# Patient Record
Sex: MALE | Race: WHITE | NOT HISPANIC OR LATINO | URBAN - METROPOLITAN AREA
[De-identification: names, ages, dates, MRNs, and addresses within clinical notes are randomized per-mention and may not be internally consistent; named-entity substitution may affect disease eponyms.]

---

## 2020-12-23 ENCOUNTER — INPATIENT (INPATIENT)
Facility: HOSPITAL | Age: 70
LOS: 5 days | Discharge: NURSING, REHAB OR LONG-TERM CARE | End: 2020-12-29
Attending: HOSPITALIST | Admitting: HOSPITALIST
Payer: MEDICARE

## 2020-12-23 VITALS
DIASTOLIC BLOOD PRESSURE: 53 MMHG | TEMPERATURE: 100 F | SYSTOLIC BLOOD PRESSURE: 102 MMHG | OXYGEN SATURATION: 92 % | HEART RATE: 67 BPM

## 2020-12-23 DIAGNOSIS — I50.9 HEART FAILURE, UNSPECIFIED: ICD-10-CM

## 2020-12-23 DIAGNOSIS — G40.909 EPILEPSY, UNSPECIFIED, NOT INTRACTABLE, WITHOUT STATUS EPILEPTICUS: ICD-10-CM

## 2020-12-23 DIAGNOSIS — K52.9 NONINFECTIVE GASTROENTERITIS AND COLITIS, UNSPECIFIED: ICD-10-CM

## 2020-12-23 DIAGNOSIS — E87.1 HYPO-OSMOLALITY AND HYPONATREMIA: ICD-10-CM

## 2020-12-23 DIAGNOSIS — Z95.0 PRESENCE OF CARDIAC PACEMAKER: Chronic | ICD-10-CM

## 2020-12-23 DIAGNOSIS — R56.9 UNSPECIFIED CONVULSIONS: ICD-10-CM

## 2020-12-23 DIAGNOSIS — N39.0 URINARY TRACT INFECTION, SITE NOT SPECIFIED: ICD-10-CM

## 2020-12-23 DIAGNOSIS — F29 UNSPECIFIED PSYCHOSIS NOT DUE TO A SUBSTANCE OR KNOWN PHYSIOLOGICAL CONDITION: ICD-10-CM

## 2020-12-23 DIAGNOSIS — I25.10 ATHEROSCLEROTIC HEART DISEASE OF NATIVE CORONARY ARTERY WITHOUT ANGINA PECTORIS: ICD-10-CM

## 2020-12-23 DIAGNOSIS — J44.9 CHRONIC OBSTRUCTIVE PULMONARY DISEASE, UNSPECIFIED: ICD-10-CM

## 2020-12-23 LAB
ALBUMIN SERPL ELPH-MCNC: 3.1 G/DL — LOW (ref 3.5–5.2)
ALP SERPL-CCNC: 85 U/L — SIGNIFICANT CHANGE UP (ref 30–115)
ALT FLD-CCNC: 11 U/L — SIGNIFICANT CHANGE UP (ref 0–41)
ANION GAP SERPL CALC-SCNC: 10 MMOL/L — SIGNIFICANT CHANGE UP (ref 7–14)
ANISOCYTOSIS BLD QL: SLIGHT — SIGNIFICANT CHANGE UP
APPEARANCE UR: ABNORMAL
APTT BLD: 29 SEC — SIGNIFICANT CHANGE UP (ref 27–39.2)
AST SERPL-CCNC: 24 U/L — SIGNIFICANT CHANGE UP (ref 0–41)
BACTERIA # UR AUTO: ABNORMAL /HPF
BASOPHILS # BLD AUTO: 0 K/UL — SIGNIFICANT CHANGE UP (ref 0–0.2)
BASOPHILS NFR BLD AUTO: 0 % — SIGNIFICANT CHANGE UP (ref 0–1)
BILIRUB SERPL-MCNC: 0.8 MG/DL — SIGNIFICANT CHANGE UP (ref 0.2–1.2)
BILIRUB UR-MCNC: NEGATIVE — SIGNIFICANT CHANGE UP
BUN SERPL-MCNC: 14 MG/DL — SIGNIFICANT CHANGE UP (ref 10–20)
CALCIUM SERPL-MCNC: 8.4 MG/DL — LOW (ref 8.5–10.1)
CHLORIDE SERPL-SCNC: 81 MMOL/L — LOW (ref 98–110)
CO2 SERPL-SCNC: 25 MMOL/L — SIGNIFICANT CHANGE UP (ref 17–32)
COLOR SPEC: YELLOW — SIGNIFICANT CHANGE UP
CREAT SERPL-MCNC: 1 MG/DL — SIGNIFICANT CHANGE UP (ref 0.7–1.5)
DIFF PNL FLD: ABNORMAL
EOSINOPHIL NFR BLD AUTO: 0 % — SIGNIFICANT CHANGE UP (ref 0–8)
EPI CELLS # UR: ABNORMAL /HPF
GLUCOSE SERPL-MCNC: 119 MG/DL — HIGH (ref 70–99)
GLUCOSE UR QL: NEGATIVE — SIGNIFICANT CHANGE UP
HCT VFR BLD CALC: 30.9 % — LOW (ref 42–52)
HGB BLD-MCNC: 10.6 G/DL — LOW (ref 14–18)
INR BLD: 1.23 RATIO — SIGNIFICANT CHANGE UP (ref 0.65–1.3)
KETONES UR-MCNC: 40
LACTATE SERPL-SCNC: 1.8 MMOL/L — SIGNIFICANT CHANGE UP (ref 0.7–2)
LEUKOCYTE ESTERASE UR-ACNC: ABNORMAL
LYMPHOCYTES # BLD AUTO: 0.48 K/UL — LOW (ref 1.2–3.4)
LYMPHOCYTES # BLD AUTO: 2 % — LOW (ref 20.5–51.1)
MANUAL SMEAR VERIFICATION: SIGNIFICANT CHANGE UP
MCHC RBC-ENTMCNC: 33.1 PG — HIGH (ref 27–31)
MCHC RBC-ENTMCNC: 34.3 G/DL — SIGNIFICANT CHANGE UP (ref 32–37)
MCV RBC AUTO: 96.6 FL — HIGH (ref 80–94)
MONOCYTES # BLD AUTO: 0.73 K/UL — HIGH (ref 0.1–0.6)
MONOCYTES NFR BLD AUTO: 3 % — SIGNIFICANT CHANGE UP (ref 1.7–9.3)
NEUTROPHILS # BLD AUTO: 22.97 K/UL — HIGH (ref 1.4–6.5)
NEUTROPHILS NFR BLD AUTO: 95 % — HIGH (ref 42.2–75.2)
NITRITE UR-MCNC: POSITIVE
NRBC # BLD: 0 /100 — SIGNIFICANT CHANGE UP (ref 0–0)
NRBC # BLD: SIGNIFICANT CHANGE UP /100 WBCS (ref 0–0)
PH UR: 7 — SIGNIFICANT CHANGE UP (ref 5–8)
PLAT MORPH BLD: NORMAL — SIGNIFICANT CHANGE UP
PLATELET # BLD AUTO: 205 K/UL — SIGNIFICANT CHANGE UP (ref 130–400)
POTASSIUM SERPL-MCNC: 5 MMOL/L — SIGNIFICANT CHANGE UP (ref 3.5–5)
POTASSIUM SERPL-SCNC: 5 MMOL/L — SIGNIFICANT CHANGE UP (ref 3.5–5)
PROT SERPL-MCNC: 5.2 G/DL — LOW (ref 6–8)
PROT UR-MCNC: 100
PROTHROM AB SERPL-ACNC: 14.1 SEC — HIGH (ref 9.95–12.87)
RAPID RVP RESULT: SIGNIFICANT CHANGE UP
RBC # BLD: 3.2 M/UL — LOW (ref 4.7–6.1)
RBC # FLD: 13.9 % — SIGNIFICANT CHANGE UP (ref 11.5–14.5)
RBC BLD AUTO: ABNORMAL
RBC CASTS # UR COMP ASSIST: ABNORMAL /HPF
SARS-COV-2 RNA SPEC QL NAA+PROBE: SIGNIFICANT CHANGE UP
SODIUM SERPL-SCNC: 116 MMOL/L — CRITICAL LOW (ref 135–146)
SP GR SPEC: 1.02 — SIGNIFICANT CHANGE UP (ref 1.01–1.03)
TROPONIN T SERPL-MCNC: 0.03 NG/ML — CRITICAL HIGH
UROBILINOGEN FLD QL: 0.2 — SIGNIFICANT CHANGE UP (ref 0.2–0.2)
WBC # BLD: 24.18 K/UL — HIGH (ref 4.8–10.8)
WBC # FLD AUTO: 24.18 K/UL — HIGH (ref 4.8–10.8)
WBC UR QL: >50 /HPF

## 2020-12-23 PROCEDURE — 99222 1ST HOSP IP/OBS MODERATE 55: CPT

## 2020-12-23 PROCEDURE — 99285 EMERGENCY DEPT VISIT HI MDM: CPT

## 2020-12-23 PROCEDURE — 74177 CT ABD & PELVIS W/CONTRAST: CPT | Mod: 26

## 2020-12-23 PROCEDURE — 71045 X-RAY EXAM CHEST 1 VIEW: CPT | Mod: 26

## 2020-12-23 PROCEDURE — 99497 ADVNCD CARE PLAN 30 MIN: CPT | Mod: 25

## 2020-12-23 PROCEDURE — 99223 1ST HOSP IP/OBS HIGH 75: CPT

## 2020-12-23 RX ORDER — CHLORHEXIDINE GLUCONATE 213 G/1000ML
1 SOLUTION TOPICAL
Refills: 0 | Status: DISCONTINUED | OUTPATIENT
Start: 2020-12-23 | End: 2020-12-29

## 2020-12-23 RX ORDER — LAMOTRIGINE 25 MG/1
1 TABLET, ORALLY DISINTEGRATING ORAL
Qty: 0 | Refills: 0 | DISCHARGE

## 2020-12-23 RX ORDER — ALBUTEROL 90 UG/1
2 AEROSOL, METERED ORAL EVERY 6 HOURS
Refills: 0 | Status: DISCONTINUED | OUTPATIENT
Start: 2020-12-23 | End: 2020-12-29

## 2020-12-23 RX ORDER — PALIPERIDONE 1.5 MG/1
1 TABLET, EXTENDED RELEASE ORAL
Qty: 0 | Refills: 0 | DISCHARGE

## 2020-12-23 RX ORDER — FOLIC ACID 0.8 MG
2 TABLET ORAL
Qty: 0 | Refills: 0 | DISCHARGE

## 2020-12-23 RX ORDER — LAMOTRIGINE 25 MG/1
0 TABLET, ORALLY DISINTEGRATING ORAL
Qty: 0 | Refills: 0 | DISCHARGE

## 2020-12-23 RX ORDER — UMECLIDINIUM 62.5 UG/1
1 AEROSOL, POWDER ORAL
Qty: 0 | Refills: 0 | DISCHARGE

## 2020-12-23 RX ORDER — ONDANSETRON 8 MG/1
4 TABLET, FILM COATED ORAL ONCE
Refills: 0 | Status: COMPLETED | OUTPATIENT
Start: 2020-12-23 | End: 2020-12-23

## 2020-12-23 RX ORDER — PREGABALIN 225 MG/1
1 CAPSULE ORAL
Qty: 0 | Refills: 0 | DISCHARGE

## 2020-12-23 RX ORDER — CLONAZEPAM 1 MG
0.25 TABLET ORAL
Refills: 0 | Status: DISCONTINUED | OUTPATIENT
Start: 2020-12-23 | End: 2020-12-29

## 2020-12-23 RX ORDER — HYDROCORTISONE 20 MG
50 TABLET ORAL THREE TIMES A DAY
Refills: 0 | Status: DISCONTINUED | OUTPATIENT
Start: 2020-12-23 | End: 2020-12-25

## 2020-12-23 RX ORDER — CLONAZEPAM 1 MG
1 TABLET ORAL
Qty: 0 | Refills: 0 | DISCHARGE

## 2020-12-23 RX ORDER — ASPIRIN/CALCIUM CARB/MAGNESIUM 324 MG
0 TABLET ORAL
Qty: 0 | Refills: 0 | DISCHARGE

## 2020-12-23 RX ORDER — CARVEDILOL PHOSPHATE 80 MG/1
0 CAPSULE, EXTENDED RELEASE ORAL
Qty: 0 | Refills: 0 | DISCHARGE

## 2020-12-23 RX ORDER — CIPROFLOXACIN LACTATE 400MG/40ML
400 VIAL (ML) INTRAVENOUS EVERY 12 HOURS
Refills: 0 | Status: DISCONTINUED | OUTPATIENT
Start: 2020-12-23 | End: 2020-12-24

## 2020-12-23 RX ORDER — METHOTREXATE 2.5 MG/1
0 TABLET ORAL
Qty: 0 | Refills: 0 | DISCHARGE

## 2020-12-23 RX ORDER — CHOLECALCIFEROL (VITAMIN D3) 125 MCG
1 CAPSULE ORAL
Qty: 0 | Refills: 0 | DISCHARGE

## 2020-12-23 RX ORDER — PANTOPRAZOLE SODIUM 20 MG/1
40 TABLET, DELAYED RELEASE ORAL
Refills: 0 | Status: DISCONTINUED | OUTPATIENT
Start: 2020-12-23 | End: 2020-12-29

## 2020-12-23 RX ORDER — METRONIDAZOLE 500 MG
500 TABLET ORAL ONCE
Refills: 0 | Status: DISCONTINUED | OUTPATIENT
Start: 2020-12-23 | End: 2020-12-24

## 2020-12-23 RX ORDER — HEPARIN SODIUM 5000 [USP'U]/ML
5000 INJECTION INTRAVENOUS; SUBCUTANEOUS EVERY 12 HOURS
Refills: 0 | Status: DISCONTINUED | OUTPATIENT
Start: 2020-12-23 | End: 2020-12-29

## 2020-12-23 RX ORDER — SODIUM CHLORIDE 9 MG/ML
1000 INJECTION INTRAMUSCULAR; INTRAVENOUS; SUBCUTANEOUS ONCE
Refills: 0 | Status: COMPLETED | OUTPATIENT
Start: 2020-12-23 | End: 2020-12-23

## 2020-12-23 RX ORDER — METHOTREXATE 2.5 MG/1
2.5 TABLET ORAL
Refills: 0 | Status: DISCONTINUED | OUTPATIENT
Start: 2020-12-28 | End: 2020-12-28

## 2020-12-23 RX ORDER — ONDANSETRON 8 MG/1
4 TABLET, FILM COATED ORAL ONCE
Refills: 0 | Status: DISCONTINUED | OUTPATIENT
Start: 2020-12-23 | End: 2020-12-29

## 2020-12-23 RX ORDER — PREGABALIN 225 MG/1
1000 CAPSULE ORAL DAILY
Refills: 0 | Status: DISCONTINUED | OUTPATIENT
Start: 2020-12-23 | End: 2020-12-29

## 2020-12-23 RX ORDER — ALBUTEROL 90 UG/1
2 AEROSOL, METERED ORAL
Qty: 0 | Refills: 0 | DISCHARGE

## 2020-12-23 RX ORDER — METRONIDAZOLE 500 MG
500 TABLET ORAL EVERY 8 HOURS
Refills: 0 | Status: DISCONTINUED | OUTPATIENT
Start: 2020-12-23 | End: 2020-12-24

## 2020-12-23 RX ORDER — ASPIRIN/CALCIUM CARB/MAGNESIUM 324 MG
1 TABLET ORAL
Qty: 0 | Refills: 0 | DISCHARGE

## 2020-12-23 RX ORDER — ATORVASTATIN CALCIUM 80 MG/1
1 TABLET, FILM COATED ORAL
Qty: 0 | Refills: 0 | DISCHARGE

## 2020-12-23 RX ORDER — CEFEPIME 1 G/1
2000 INJECTION, POWDER, FOR SOLUTION INTRAMUSCULAR; INTRAVENOUS ONCE
Refills: 0 | Status: COMPLETED | OUTPATIENT
Start: 2020-12-23 | End: 2020-12-23

## 2020-12-23 RX ORDER — ASPIRIN/CALCIUM CARB/MAGNESIUM 324 MG
81 TABLET ORAL DAILY
Refills: 0 | Status: DISCONTINUED | OUTPATIENT
Start: 2020-12-23 | End: 2020-12-29

## 2020-12-23 RX ORDER — ATORVASTATIN CALCIUM 80 MG/1
40 TABLET, FILM COATED ORAL DAILY
Refills: 0 | Status: DISCONTINUED | OUTPATIENT
Start: 2020-12-23 | End: 2020-12-29

## 2020-12-23 RX ORDER — CHOLECALCIFEROL (VITAMIN D3) 125 MCG
1000 CAPSULE ORAL DAILY
Refills: 0 | Status: DISCONTINUED | OUTPATIENT
Start: 2020-12-23 | End: 2020-12-29

## 2020-12-23 RX ORDER — LAMOTRIGINE 25 MG/1
200 TABLET, ORALLY DISINTEGRATING ORAL
Refills: 0 | Status: DISCONTINUED | OUTPATIENT
Start: 2020-12-23 | End: 2020-12-29

## 2020-12-23 RX ORDER — SODIUM CHLORIDE 9 MG/ML
1000 INJECTION, SOLUTION INTRAVENOUS ONCE
Refills: 0 | Status: COMPLETED | OUTPATIENT
Start: 2020-12-23 | End: 2020-12-23

## 2020-12-23 RX ORDER — CLONAZEPAM 1 MG
0 TABLET ORAL
Qty: 0 | Refills: 0 | DISCHARGE

## 2020-12-23 RX ORDER — SODIUM CHLORIDE 9 MG/ML
1000 INJECTION INTRAMUSCULAR; INTRAVENOUS; SUBCUTANEOUS
Refills: 0 | Status: DISCONTINUED | OUTPATIENT
Start: 2020-12-23 | End: 2020-12-25

## 2020-12-23 RX ORDER — FOLIC ACID 0.8 MG
2 TABLET ORAL DAILY
Refills: 0 | Status: DISCONTINUED | OUTPATIENT
Start: 2020-12-23 | End: 2020-12-29

## 2020-12-23 RX ADMIN — Medication 100 MILLIGRAM(S): at 18:25

## 2020-12-23 RX ADMIN — SODIUM CHLORIDE 100 MILLILITER(S): 9 INJECTION INTRAMUSCULAR; INTRAVENOUS; SUBCUTANEOUS at 23:00

## 2020-12-23 RX ADMIN — SODIUM CHLORIDE 1000 MILLILITER(S): 9 INJECTION, SOLUTION INTRAVENOUS at 12:00

## 2020-12-23 RX ADMIN — LAMOTRIGINE 200 MILLIGRAM(S): 25 TABLET, ORALLY DISINTEGRATING ORAL at 18:26

## 2020-12-23 RX ADMIN — Medication 100 MILLIGRAM(S): at 22:16

## 2020-12-23 RX ADMIN — ONDANSETRON 4 MILLIGRAM(S): 8 TABLET, FILM COATED ORAL at 12:55

## 2020-12-23 RX ADMIN — SODIUM CHLORIDE 1000 MILLILITER(S): 9 INJECTION, SOLUTION INTRAVENOUS at 14:07

## 2020-12-23 RX ADMIN — Medication 50 MILLIGRAM(S): at 22:16

## 2020-12-23 RX ADMIN — SODIUM CHLORIDE 1000 MILLILITER(S): 9 INJECTION INTRAMUSCULAR; INTRAVENOUS; SUBCUTANEOUS at 15:47

## 2020-12-23 RX ADMIN — CEFEPIME 2000 MILLIGRAM(S): 1 INJECTION, POWDER, FOR SOLUTION INTRAMUSCULAR; INTRAVENOUS at 14:06

## 2020-12-23 RX ADMIN — HEPARIN SODIUM 5000 UNIT(S): 5000 INJECTION INTRAVENOUS; SUBCUTANEOUS at 18:26

## 2020-12-23 RX ADMIN — CEFEPIME 100 MILLIGRAM(S): 1 INJECTION, POWDER, FOR SOLUTION INTRAMUSCULAR; INTRAVENOUS at 12:55

## 2020-12-23 RX ADMIN — SODIUM CHLORIDE 1000 MILLILITER(S): 9 INJECTION INTRAMUSCULAR; INTRAVENOUS; SUBCUTANEOUS at 14:00

## 2020-12-23 NOTE — H&P ADULT - NSICDXPASTMEDICALHX_GEN_ALL_CORE_FT
PAST MEDICAL HISTORY:  CAD (coronary artery disease)     CHF (congestive heart failure)     COPD, mild     Epilepsy     Psychoses paranoia

## 2020-12-23 NOTE — ED PROVIDER NOTE - OBJECTIVE STATEMENT
69 yo male, pmh htn, hld, seizure d/o, cad pacemaker, presents to ed for hypotension, fever and dysuria from NH. Pt states several days, unsure if has been on abx. Admits to weakness, generalized, no specific pain or radiation. Denies cp, sob, abd pain, nvd, le swelling, hematuria, back pain.

## 2020-12-23 NOTE — ED PROVIDER NOTE - CARE PLAN
Principal Discharge DX:	Septic shock  Secondary Diagnosis:	Hyponatremia  Secondary Diagnosis:	UTI (urinary tract infection)  Secondary Diagnosis:	Acute colitis  Secondary Diagnosis:	Elevated troponin   Principal Discharge DX:	Acute colitis  Secondary Diagnosis:	Hyponatremia  Secondary Diagnosis:	UTI (urinary tract infection)  Secondary Diagnosis:	Elevated troponin

## 2020-12-23 NOTE — BEHAVIORAL HEALTH ASSESSMENT NOTE - HPI (INCLUDE ILLNESS QUALITY, SEVERITY, DURATION, TIMING, CONTEXT, MODIFYING FACTORS, ASSOCIATED SIGNS AND SYMPTOMS)
Pt is a 69 yo Male, domiciled with his wife, retired, PPH of paranoia (Psychosis NOS), no prior IPP admissions, PMH significant for Htn, HLD, CAD s/p pacemaker, seizure disorder who is admitted to the medical floor for hypotension, fever and dysuria. Psychiatry consulted to recommend alternatives to Invega ER 3 mg as it is not available on the formulary.            On evaluation patient reports that he was prescribed Invega ER by his OP psychiatrist who he sees via tele-psych for some paranoid delusions he developed about a year ago. He notes that since he started the Invega ER he feels better and has not had any paranoia. He denies any mental health issues prior to last year. He denies any problems with his mood. he also denies anxiety except for his medical issues. He denies any panic attacks. He denies AH,VH, parnoia, manic or hypomanic symptoms. Currently he denies any acute psychiatric symptoms.            Collateral has been obtained from the patient's wife Ms Gladis Paniagua ([phone]) who reports that the patient has been living by himself in his [place] in [place] until recently. He was having falls and was hospitalized which is why his wife who was living in [place] brought him here. She notes that during his last hospitalization he developed hospital delirium which resolved and at baseline he is sharp and does most things by himself. He is also active physically at baseline. She also corroborates patient's story about the paranoia and notes that she would be able to bring the Paliperidone to the hospital if needed.

## 2020-12-23 NOTE — ED PROVIDER NOTE - NS ED ROS FT
Constitutional: (+) fever, (-) chills, (+) weakness  Eyes: (-) visual changes  ENT: (-) nasal congestions  Cardiovascular: (-) chest pain, (-) syncope  Respiratory: (-) cough, (-) shortness of breath, (-) dyspnea,   Gastrointestinal: (-) vomiting, (-) diarrhea, (-)nausea,  Musculoskeletal: (-) neck pain, (-) back pain, (-) joint pain,  Integumentary: (-) rash, (-) edema, (-) bruises  Neurological: (-) headache, (-) loc, (-) dizziness, (-) tingling, (-)numbness  Peripheral Vascular: (-) leg swelling  :  (+)dysuria,  (-) hematuria  Allergic/Immunologic: (-) pruritus

## 2020-12-23 NOTE — H&P ADULT - ASSESSMENT
ASSESSMENT: Pt is a 70M w/ PMH  CHF, COPD, CAD, epilepsy admitted for UTI sepsis, hypotension, and hyponatremia. In ED, pt is febrile to 100.4 w/ WBC: 24.1. Last BP: 114/57, HR: 72, Na: 116, Troponin: 0.03, Positive UA, Ct abdomen: colonic wall thickening with pericolonic inflammation, mild ascites, and b/l pleural effusions. Pt was given 2g cefepime and 500mg flagyl, 1L LR, 1L NS and zofran in ED      PLAN: Case d/w Dr. Paz   - Admit to inpatient level of care - medicine   - Continue IV abx   - Continue IVF - NS @ 100  - ID consult   - Renal consult   - Psych consult   - Trend sodium   - AM labs   - Hold Coreg - 2/2 hypotension; restart when bp stabilizes  - Continue home medications  - Regular diet   - VTE: SQ heparin  - GI: protonix

## 2020-12-23 NOTE — BEHAVIORAL HEALTH ASSESSMENT NOTE - NSBHCHARTREVIEWVS_PSY_A_CORE FT
ICU Vital Signs Last 24 Hrs  T(C): 35.9 (23 Dec 2020 21:56), Max: 38 (23 Dec 2020 11:47)  T(F): 96.6 (23 Dec 2020 21:56), Max: 100.4 (23 Dec 2020 11:47)  HR: 63 (23 Dec 2020 21:56) (59 - 84)  BP: 100/56 (23 Dec 2020 21:56) (88/56 - 132/69)  RR: 17 (23 Dec 2020 21:56) (17 - 19)  SpO2: 95% (23 Dec 2020 17:06) (92% - 100%)

## 2020-12-23 NOTE — ED PROVIDER NOTE - ATTENDING CONTRIBUTION TO CARE
I was present for and supervised the key and critical aspects of the procedures performed during the care of the patient. ATTENDING NOTE: I personally evaluated the patient. I reviewed the Physician Assistant’s note (as assigned above), and agree with the findings and plan except as documented in my note. 71 y/o M PMH depression and seizures brought in for weakness and low blood pressure from rehab facility. Pt denies any GARCIA, visual changes, CP, SOB, ABD pain or back pain but does endorse nausea. + Texas catheter in place. On exam: (+) dehydrated appearing. NCAT. PERRLA, EOMI. OP clear. Lungs CTAB. RRR, S1S2 noted. Radial pulses 2+ and equal, pedal pulses 2+ and equal. Abdomen soft, NT/ND, no rebound or guarding. FROM x4 extremities. No focal neuro deficits. Plan: EKG, and labs. Pt vomited here in ED, given IV Zofran. Found to have WBC 23. Will obtain scan and continue to monitor.

## 2020-12-23 NOTE — BEHAVIORAL HEALTH ASSESSMENT NOTE - RISK ASSESSMENT
Age, hx of paranoia elevate his risk which is mitigated by lack of prior attempts, family support, pt's sense of feeling towards family. Low Acute Suicide Risk

## 2020-12-23 NOTE — ED PROVIDER NOTE - CLINICAL SUMMARY MEDICAL DECISION MAKING FREE TEXT BOX
Patient presents with vomiting  and abdominal pain we administered iv fluids iv antibiotics patient has improved based on bp icu consulted I will admit for further monitoring at this time

## 2020-12-23 NOTE — BEHAVIORAL HEALTH ASSESSMENT NOTE - OTHER PAST PSYCHIATRIC HISTORY (INCLUDE DETAILS REGARDING ONSET, COURSE OF ILLNESS, INPATIENT/OUTPATIENT TREATMENT)
No prior IPP admissions, Depression.   Currently sees a psychiatrist via tele-psych (in his home town of [place])  Denies any therapy.

## 2020-12-23 NOTE — BEHAVIORAL HEALTH ASSESSMENT NOTE - SUMMARY
Pt is a 71 yo Male, domiciled with his wife, retired, PPH of paranoia (Psychosis NOS), no prior IPP admissions, PMH significant for Htn, HLD, CAD s/p pacemaker, seizure disorder who is admitted to the medical floor for hypotension, fever and dysuria. Psychiatry consulted to recommend alternatives to Invega ER 3 mg as it is not available on the formulary.           On evaluation, pt endorses appropriate anxiety about his UTI and medical situation. He denies any acute psychiatric symptoms and he does not appear depressed, suicidal , psychotic, paranoid or manic. However given his recent hx of delirium in hospital, age, current infection he is at higher risk for developing delirium. He is also found to be hyponatremic.             Will recommend to the team to continue to treat underlying medical issues. Will recommend to hold the Paliperidone ER given that the patient does not have any acute symptoms and also his hyponatremia. It can be restarted once the hyponatremia resolves. Pt's wife willing to bring his home medication as it is not available on the formulary.

## 2020-12-23 NOTE — ED PROVIDER NOTE - PROGRESS NOTE DETAILS
ATTENDING NOTE: I personally evaluated the patient. I reviewed the Physician Assistant’s note (as assigned above), and agree with the findings and plan except as documented in my note. 71 y/o M PMH depression and seizures brought in for weakness and low blood pressure from rehab facility. Pt denies any GARCIA, visual changes, CP, SOB, ABD pain or back pain but does endorse nausea. + [place] catheter in place. On exam: (+) dehydrated appearing. NCAT. PERRLA, EOMI. OP clear. Lungs CTAB. RRR, S1S2 noted. Radial pulses 2+ and equal, pedal pulses 2+ and equal. Abdomen soft, NT/ND, no rebound or guarding. FROM x4 extremities. No focal neuro deficits. Plan: EKG, and labs. Pt vomited here in ED, given IV Zofran. Found to have WBC 23. Will obtain scan and continue to monitor. d/w icu kylah, cleared for floor. dr landon aware of pt.

## 2020-12-23 NOTE — H&P ADULT - NSHPLABSRESULTS_GEN_ALL_CORE
10.6   24.18 )-----------( 205      ( 23 Dec 2020 12:15 )             30.9           116<LL>  |  81<L>  |  14  ----------------------------<  119<H>  5.0   |  25  |  1.0    Ca    8.4<L>      23 Dec 2020 12:15    TPro  5.2<L>  /  Alb  3.1<L>  /  TBili  0.8  /  DBili  x   /  AST  24  /  ALT  11  /  AlkPhos  85                    Urinalysis Basic - ( 23 Dec 2020 12:15 )    Color: Yellow / Appearance: Cloudy / S.025 / pH: x  Gluc: x / Ketone: 40  / Bili: Negative / Urobili: 0.2   Blood: x / Protein: 100 / Nitrite: Positive   Leuk Esterase: Large / RBC: 11-25 /HPF / WBC >50 /HPF   Sq Epi: x / Non Sq Epi: Few /HPF / Bacteria: TNTC /HPF        PT/INR - ( 23 Dec 2020 12:15 )   PT: 14.10 sec;   INR: 1.23 ratio         PTT - ( 23 Dec 2020 12:15 )  PTT:29.0 sec    Lactate Trend   @ 12:15 Lactate:1.8       CARDIAC MARKERS ( 23 Dec 2020 12:15 )  x     / 0.03 ng/mL / x     / x     / x            < from: CT Abdomen and Pelvis w/ IV Cont (20 @ 15:23) >      IMPRESSION:    1. Moderate diffuse colonic wall thickening, with associated pericolic inflammation, compatible with acute colitis.    2. Small volume abdominopelvic ascites.    3. Small left inguinal hernia containing a short segment of nonobstructed sigmoid colon.    4. Small bilateral pleural effusions with bibasilar compressive atelectasis.              ELMER BEST MD; Attending Radiologist  This document has been electronically signed. Dec 23 2020  4:03PM    < end of copied text >    < from: Xray Chest 1 View-PORTABLE IMMEDIATE (20 @ 12:57) >      Impression:    Bilateral prominent interstitial markings. No definite focal consolidation, effusion, or pneumothorax.              HUNTER MASSEY MD; Attending Radiologist  This document has been electronically signed. Dec 23 2020  1:24PM

## 2020-12-23 NOTE — H&P ADULT - HISTORY OF PRESENT ILLNESS
Pt is a 70M w/ PMH  CHF, COPD, CAD, epilepsy admitted for UTI sepsis, hypotension, and hyponatremia. Per patient, he developed dysuria, frequency and decreased urinary output 2-3 days ago. Shortly after that he developed some confusion. Recent admission with frequent falls and c.diff. Pt reports chronic hyponatremia (high 120s). Last BM this AM normal consistency and color.  Denies diarrhea, abdominal pain, and fevers. Denies HA, dizziness fever/chills, cough, rhinorrhea, SOB, chest pain, abdominal pain, change in BM.       In ED, pt is febrile to 100.4 w/ WBC: 24.1. Last BP: 114/57, HR: 72, Na: 116, Troponin: 0.03, Positive UA, Ct abdomen: colonic wall thickening with pericolonic inflammation, mild ascites, and b/l pleural effusions.

## 2020-12-23 NOTE — BEHAVIORAL HEALTH ASSESSMENT NOTE - NSBHCHARTREVIEWLAB_PSY_A_CORE FT
CBC Full  -  ( 23 Dec 2020 12:15 )  WBC Count : 24.18 K/uL  RBC Count : 3.20 M/uL  Hemoglobin : 10.6 g/dL  Hematocrit : 30.9 %  Platelet Count - Automated : 205 K/uL  Mean Cell Volume : 96.6 fL  Mean Cell Hemoglobin : 33.1 pg  Mean Cell Hemoglobin Concentration : 34.3 g/dL  Auto Neutrophil # : 22.97 K/uL  Auto Lymphocyte # : 0.48 K/uL  Auto Monocyte # : 0.73 K/uL  Auto Eosinophil # : x  Auto Basophil # : 0.00 K/uL  Auto Neutrophil % : 95.0 %  Auto Lymphocyte % : 2.0 %  Auto Monocyte % : 3.0 %  Auto Eosinophil % : 0.0 %  Auto Basophil % : 0.0 %      12-23    116<LL>  |  81<L>  |  14  ----------------------------<  119<H>  5.0   |  25  |  1.0    Ca    8.4<L>      23 Dec 2020 12:15    TPro  5.2<L>  /  Alb  3.1<L>  /  TBili  0.8  /  DBili  x   /  AST  24  /  ALT  11  /  AlkPhos  85  12-23

## 2020-12-23 NOTE — H&P ADULT - ATTENDING COMMENTS
#Severe sepsis, presumed due to uti +/- colitis  ?recently treated for cdiff, will clarify further with family  cont cipro, flagyl for now  gi pcr  check bcx, ucx  bp improved s/p ivf; cont ns 100 cc/hr  c/b hyponatremia, check am cortisol, tsh; r/o adrenal insufficiency  on chronic prednisone, start stress dose steroids solu cortef 50 tid  id, renal consult  hold antipsychotic

## 2020-12-23 NOTE — BEHAVIORAL HEALTH ASSESSMENT NOTE - NS ED [facility] MED ROS PSYCHIATRIC
Ref Range & Units    SARS CoV 2 RNA NOT DETECTED NOT DETECTED        Mailbox full. Letter sent.    See HPI

## 2020-12-23 NOTE — H&P ADULT - NSHPSOCIALHISTORY_GEN_ALL_CORE
Problem: Respiratory Impairment - Respiratory Therapy 253  Intervention: Respiratory support devices  Note: Intervention Status  Done      Denies smoking, drinking and drug use

## 2020-12-23 NOTE — BEHAVIORAL HEALTH ASSESSMENT NOTE - ORIENTED TO TIME
Patient tolerated infusion well  Denies any discomfort  Pt is aware of all future appointments  Pt escorted to her daughter's car via wheelchair by this nurse  Yes

## 2020-12-23 NOTE — H&P ADULT - NSHPPHYSICALEXAM_GEN_ALL_CORE
T(C): 38 (12-23-20 @ 11:47), Max: 38 (12-23-20 @ 11:47)  HR: 72 (12-23-20 @ 15:43) (59 - 80)  BP: 114/57 (12-23-20 @ 15:43) (88/56 - 132/69)  RR: 18 (12-23-20 @ 15:43) (18 - 19)  SpO2: 99% (12-23-20 @ 15:43) (92% - 100%)    PHYSICAL EXAM:  GENERAL: NAD, AOx3  CHEST/LUNG: Clear to auscultation bilaterally; No rales, rhonchi or wheezing  HEART: S1,S2 Regular rate and rhythm; No murmurs, rubs, or gallops  ABDOMEN: Soft, nontender, nondistended, no rebound tenderness; No palpable masses, +BS  EXTREMITIES:  2+ peripheral pulses bilaterally and symmetrically, no clubbing, cyanosis, or edema  NEUROLOGY: non-focal, muscle strength 5/5 all extremities, DTRs 2+ symmetrically

## 2020-12-23 NOTE — GOALS OF CARE CONVERSATION - ADVANCED CARE PLANNING - CONVERSATION DETAILS
D/w pt at bedside; he is admitted with suspected severe sepsis. He has no living will. He is full code at this time, he will discuss further with his wife.

## 2020-12-24 LAB
ALBUMIN SERPL ELPH-MCNC: 2.7 G/DL — LOW (ref 3.5–5.2)
ALP SERPL-CCNC: 64 U/L — SIGNIFICANT CHANGE UP (ref 30–115)
ALT FLD-CCNC: 9 U/L — SIGNIFICANT CHANGE UP (ref 0–41)
ANION GAP SERPL CALC-SCNC: 10 MMOL/L — SIGNIFICANT CHANGE UP (ref 7–14)
ANION GAP SERPL CALC-SCNC: 11 MMOL/L — SIGNIFICANT CHANGE UP (ref 7–14)
ANION GAP SERPL CALC-SCNC: 6 MMOL/L — LOW (ref 7–14)
AST SERPL-CCNC: 28 U/L — SIGNIFICANT CHANGE UP (ref 0–41)
BILIRUB SERPL-MCNC: 0.4 MG/DL — SIGNIFICANT CHANGE UP (ref 0.2–1.2)
BUN SERPL-MCNC: 11 MG/DL — SIGNIFICANT CHANGE UP (ref 10–20)
BUN SERPL-MCNC: 11 MG/DL — SIGNIFICANT CHANGE UP (ref 10–20)
BUN SERPL-MCNC: 14 MG/DL — SIGNIFICANT CHANGE UP (ref 10–20)
CALCIUM SERPL-MCNC: 8 MG/DL — LOW (ref 8.5–10.1)
CALCIUM SERPL-MCNC: 8 MG/DL — LOW (ref 8.5–10.1)
CALCIUM SERPL-MCNC: 8.1 MG/DL — LOW (ref 8.5–10.1)
CHLORIDE SERPL-SCNC: 87 MMOL/L — LOW (ref 98–110)
CHLORIDE SERPL-SCNC: 89 MMOL/L — LOW (ref 98–110)
CHLORIDE SERPL-SCNC: 93 MMOL/L — LOW (ref 98–110)
CO2 SERPL-SCNC: 19 MMOL/L — SIGNIFICANT CHANGE UP (ref 17–32)
CO2 SERPL-SCNC: 21 MMOL/L — SIGNIFICANT CHANGE UP (ref 17–32)
CO2 SERPL-SCNC: 23 MMOL/L — SIGNIFICANT CHANGE UP (ref 17–32)
CREAT SERPL-MCNC: 0.7 MG/DL — SIGNIFICANT CHANGE UP (ref 0.7–1.5)
CREAT SERPL-MCNC: 0.8 MG/DL — SIGNIFICANT CHANGE UP (ref 0.7–1.5)
CREAT SERPL-MCNC: 0.9 MG/DL — SIGNIFICANT CHANGE UP (ref 0.7–1.5)
GLUCOSE SERPL-MCNC: 127 MG/DL — HIGH (ref 70–99)
GLUCOSE SERPL-MCNC: 138 MG/DL — HIGH (ref 70–99)
GLUCOSE SERPL-MCNC: 139 MG/DL — HIGH (ref 70–99)
HCT VFR BLD CALC: 30.8 % — LOW (ref 42–52)
HCV AB S/CO SERPL IA: 0.04 COI — SIGNIFICANT CHANGE UP
HCV AB SERPL-IMP: SIGNIFICANT CHANGE UP
HGB BLD-MCNC: 10.2 G/DL — LOW (ref 14–18)
MCHC RBC-ENTMCNC: 33 PG — HIGH (ref 27–31)
MCHC RBC-ENTMCNC: 33.1 G/DL — SIGNIFICANT CHANGE UP (ref 32–37)
MCV RBC AUTO: 99.7 FL — HIGH (ref 80–94)
NRBC # BLD: 0 /100 WBCS — SIGNIFICANT CHANGE UP (ref 0–0)
PLATELET # BLD AUTO: 168 K/UL — SIGNIFICANT CHANGE UP (ref 130–400)
POTASSIUM SERPL-MCNC: 4.5 MMOL/L — SIGNIFICANT CHANGE UP (ref 3.5–5)
POTASSIUM SERPL-MCNC: 4.5 MMOL/L — SIGNIFICANT CHANGE UP (ref 3.5–5)
POTASSIUM SERPL-MCNC: 4.8 MMOL/L — SIGNIFICANT CHANGE UP (ref 3.5–5)
POTASSIUM SERPL-SCNC: 4.5 MMOL/L — SIGNIFICANT CHANGE UP (ref 3.5–5)
POTASSIUM SERPL-SCNC: 4.5 MMOL/L — SIGNIFICANT CHANGE UP (ref 3.5–5)
POTASSIUM SERPL-SCNC: 4.8 MMOL/L — SIGNIFICANT CHANGE UP (ref 3.5–5)
PROT SERPL-MCNC: 4.6 G/DL — LOW (ref 6–8)
RBC # BLD: 3.09 M/UL — LOW (ref 4.7–6.1)
RBC # FLD: 14.1 % — SIGNIFICANT CHANGE UP (ref 11.5–14.5)
SARS-COV-2 IGG SERPL QL IA: NEGATIVE — SIGNIFICANT CHANGE UP
SARS-COV-2 IGM SERPL IA-ACNC: 0.06 INDEX — SIGNIFICANT CHANGE UP
SODIUM SERPL-SCNC: 116 MMOL/L — CRITICAL LOW (ref 135–146)
SODIUM SERPL-SCNC: 118 MMOL/L — CRITICAL LOW (ref 135–146)
SODIUM SERPL-SCNC: 125 MMOL/L — LOW (ref 135–146)
WBC # BLD: 17.89 K/UL — HIGH (ref 4.8–10.8)
WBC # FLD AUTO: 17.89 K/UL — HIGH (ref 4.8–10.8)

## 2020-12-24 PROCEDURE — 99233 SBSQ HOSP IP/OBS HIGH 50: CPT

## 2020-12-24 RX ORDER — VANCOMYCIN HCL 1 G
125 VIAL (EA) INTRAVENOUS DAILY
Refills: 0 | Status: DISCONTINUED | OUTPATIENT
Start: 2020-12-24 | End: 2020-12-29

## 2020-12-24 RX ORDER — CEFEPIME 1 G/1
1000 INJECTION, POWDER, FOR SOLUTION INTRAMUSCULAR; INTRAVENOUS EVERY 8 HOURS
Refills: 0 | Status: DISCONTINUED | OUTPATIENT
Start: 2020-12-24 | End: 2020-12-26

## 2020-12-24 RX ORDER — SODIUM CHLORIDE 9 MG/ML
1 INJECTION INTRAMUSCULAR; INTRAVENOUS; SUBCUTANEOUS ONCE
Refills: 0 | Status: COMPLETED | OUTPATIENT
Start: 2020-12-24 | End: 2020-12-24

## 2020-12-24 RX ORDER — SODIUM CHLORIDE 9 MG/ML
1 INJECTION INTRAMUSCULAR; INTRAVENOUS; SUBCUTANEOUS THREE TIMES A DAY
Refills: 0 | Status: DISCONTINUED | OUTPATIENT
Start: 2020-12-24 | End: 2020-12-25

## 2020-12-24 RX ADMIN — Medication 100 MILLIGRAM(S): at 05:16

## 2020-12-24 RX ADMIN — SODIUM CHLORIDE 1 GRAM(S): 9 INJECTION INTRAMUSCULAR; INTRAVENOUS; SUBCUTANEOUS at 21:01

## 2020-12-24 RX ADMIN — Medication 81 MILLIGRAM(S): at 11:59

## 2020-12-24 RX ADMIN — Medication 50 MILLIGRAM(S): at 05:24

## 2020-12-24 RX ADMIN — CEFEPIME 100 MILLIGRAM(S): 1 INJECTION, POWDER, FOR SOLUTION INTRAMUSCULAR; INTRAVENOUS at 13:46

## 2020-12-24 RX ADMIN — LAMOTRIGINE 200 MILLIGRAM(S): 25 TABLET, ORALLY DISINTEGRATING ORAL at 17:45

## 2020-12-24 RX ADMIN — CEFEPIME 100 MILLIGRAM(S): 1 INJECTION, POWDER, FOR SOLUTION INTRAMUSCULAR; INTRAVENOUS at 21:01

## 2020-12-24 RX ADMIN — Medication 50 MILLIGRAM(S): at 21:01

## 2020-12-24 RX ADMIN — HEPARIN SODIUM 5000 UNIT(S): 5000 INJECTION INTRAVENOUS; SUBCUTANEOUS at 17:46

## 2020-12-24 RX ADMIN — ATORVASTATIN CALCIUM 40 MILLIGRAM(S): 80 TABLET, FILM COATED ORAL at 11:59

## 2020-12-24 RX ADMIN — Medication 50 MILLIGRAM(S): at 13:47

## 2020-12-24 RX ADMIN — HEPARIN SODIUM 5000 UNIT(S): 5000 INJECTION INTRAVENOUS; SUBCUTANEOUS at 05:24

## 2020-12-24 RX ADMIN — SODIUM CHLORIDE 1 GRAM(S): 9 INJECTION INTRAMUSCULAR; INTRAVENOUS; SUBCUTANEOUS at 04:06

## 2020-12-24 RX ADMIN — SODIUM CHLORIDE 1 GRAM(S): 9 INJECTION INTRAMUSCULAR; INTRAVENOUS; SUBCUTANEOUS at 13:47

## 2020-12-24 RX ADMIN — PREGABALIN 1000 MICROGRAM(S): 225 CAPSULE ORAL at 11:59

## 2020-12-24 RX ADMIN — Medication 0.25 MILLIGRAM(S): at 06:22

## 2020-12-24 RX ADMIN — Medication 2 MILLIGRAM(S): at 11:59

## 2020-12-24 RX ADMIN — PANTOPRAZOLE SODIUM 40 MILLIGRAM(S): 20 TABLET, DELAYED RELEASE ORAL at 05:24

## 2020-12-24 RX ADMIN — LAMOTRIGINE 200 MILLIGRAM(S): 25 TABLET, ORALLY DISINTEGRATING ORAL at 05:24

## 2020-12-24 RX ADMIN — Medication 1000 UNIT(S): at 11:59

## 2020-12-24 NOTE — CONSULT NOTE ADULT - SUBJECTIVE AND OBJECTIVE BOX
ABIDA JONES  70y, Male  Allergy: Lasix (Blisters)      CHIEF COMPLAINT: UTI sepsis (23 Dec 2020 17:40)      LOS  1d    HPI:  Pt is a 70M w/ PMH  CHF, COPD, CAD, epilepsy admitted for UTI sepsis, hypotension, and hyponatremia. Per patient, he developed dysuria, frequency and decreased urinary output 2-3 days ago. Shortly after that he developed some confusion. Recent admission with frequent falls and c.diff. Pt reports chronic hyponatremia (high 120s). Last BM this AM normal consistency and color.  Denies diarrhea, abdominal pain, and fevers. Denies HA, dizziness fever/chills, cough, rhinorrhea, SOB, chest pain, abdominal pain, change in BM.       In ED, pt is febrile to 100.4 w/ WBC: 24.1. Last BP: 114/57, HR: 72, Na: 116, Troponin: 0.03, Positive UA, Ct abdomen: colonic wall thickening with pericolonic inflammation, mild ascites, and b/l pleural effusions.  (23 Dec 2020 17:40)      INFECTIOUS DISEASE HISTORY:  History as above,   Reports recently admitted in Late November to early December at [facility] for recurrent falls.   Says course was complicated with C diff infection.   Having dysuria, and also fevers.   Started on cipro/flagyl empirically.   Reports improvement in dysuria.   Denies any hx of UTI in the past.   No nausea, vomiting, abdominal pain.   Denies any diarrhea.   No coughing/chest pain, shortness of breath.     PAST MEDICAL & SURGICAL HISTORY:  Epilepsy    CAD (coronary artery disease)    COPD, mild    Psychoses  paranoia    CHF (congestive heart failure)    Cardiac pacemaker  with defibrilator        FAMILY HISTORY      SOCIAL HISTORY  Social History:  Denies smoking, drinking and drug use (23 Dec 2020 17:40)        ROS  General: Denies rigors, nightsweats  HEENT: Denies headache, rhinorrhea, sore throat, eye pain  CV: Denies CP, palpitations  PULM: Denies wheezing, hemoptysis  GI: Denies hematemesis, hematochezia, melena  : Denies discharge, hematuria  MSK: Denies arthralgias, myalgias  SKIN: Denies rash, lesions  NEURO: Denies paresthesias, weakness  PSYCH: Denies depression, anxiety    VITALS:  T(F): 97.1, Max: 98.8 (20 @ 19:25)  HR: 60  BP: 103/54  RR: 18Vital Signs Last 24 Hrs  T(C): 36.2 (24 Dec 2020 05:00), Max: 37.1 (23 Dec 2020 19:25)  T(F): 97.1 (24 Dec 2020 05:00), Max: 98.8 (23 Dec 2020 19:25)  HR: 60 (24 Dec 2020 05:00) (59 - 84)  BP: 103/54 (24 Dec 2020 05:00) (88/56 - 132/69)  BP(mean): --  RR: 18 (24 Dec 2020 05:00) (17 - 19)  SpO2: 95% (24 Dec 2020 08:29) (93% - 100%)    PHYSICAL EXAM:  Gen: NAD, resting in bed  HEENT: Normocephalic, atraumatic  Neck: supple, no lymphadenopathy  CV: Regular rate & regular rhythm  Lungs: decreased BS at bases, no fremitus  Abdomen: Soft, BS present  Ext: Warm, well perfused  Neuro: non focal, awake  Skin: no rash, no erythema  Lines: no phlebitis    TESTS & MEASUREMENTS:                        10.2   17.89 )-----------( 168      ( 24 Dec 2020 08:29 )             30.8         118<LL>  |  89<L>  |  11  ----------------------------<  139<H>  4.5   |  19  |  0.8    Ca    8.0<L>      24 Dec 2020 08:29    TPro  4.6<L>  /  Alb  2.7<L>  /  TBili  0.4  /  DBili  x   /  AST  28  /  ALT  9   /  AlkPhos  64      eGFR if Non African American: 89 mL/min/1.73M2 (20 @ 08:29)  eGFR if : 103 mL/min/1.73M2 (20 @ 08:29)  eGFR if Non African American: 94 mL/min/1.73M2 (20 @ 03:20)  eGFR if African American: 109 mL/min/1.73M2 (20 @ 03:20)    LIVER FUNCTIONS - ( 24 Dec 2020 08:29 )  Alb: 2.7 g/dL / Pro: 4.6 g/dL / ALK PHOS: 64 U/L / ALT: 9 U/L / AST: 28 U/L / GGT: x           Urinalysis Basic - ( 23 Dec 2020 12:15 )    Color: Yellow / Appearance: Cloudy / S.025 / pH: x  Gluc: x / Ketone: 40  / Bili: Negative / Urobili: 0.2   Blood: x / Protein: 100 / Nitrite: Positive   Leuk Esterase: Large / RBC: 11-25 /HPF / WBC >50 /HPF   Sq Epi: x / Non Sq Epi: Few /HPF / Bacteria: TNTC /HPF          Lactate, Blood: 1.8 mmol/L (20 @ 12:15)      INFECTIOUS DISEASES TESTING      RADIOLOGY & ADDITIONAL TESTS:  I have personally reviewed the last Chest xray  CXR      CT  CT Abdomen and Pelvis w/ IV Cont:   EXAM:  CT ABDOMEN AND PELVIS IC            PROCEDURE DATE:  2020            INTERPRETATION:  CLINICAL STATEMENT: Vomiting. Sepsis.    TECHNIQUE: Contiguous axial CT images were obtained from the lower chest to the pubic symphysis following administration of 100cc Optiray 320 intravenous contrast.  Oral contrast was not administered.  Reformatted images in the coronal and sagittal planes were acquired.    COMPARISON CT: None.    OTHER STUDIES USED FOR CORRELATION: None.      FINDINGS:    LOWER CHEST: Trace bilateral pleural effusions with bibasilar compressive atelectasis. Partially imaged pacer lead within the right ventricle.    HEPATOBILIARY: Mild periportal edema.    SPLEEN: Unremarkable.    PANCREAS: Unremarkable.    ADRENAL GLANDS: Unremarkable.    KIDNEYS: A 1.1 cm left renal hypodensity is incompletely characterized. No hydronephrosis.    ABDOMINOPELVIC NODES: Unremarkable.    PELVIC ORGANS: Unremarkable.    PERITONEUM/MESENTERY/BOWEL: Small left inguinal hernia containing a short segment of nonobstructed sigmoid colon. Moderate diffuse colonic wall thickening is noted, with associated pericolonic inflammation, compatible with acute colitis. Small volume abdominopelvic ascites. No evidence of bowel obstruction. No free intraperitoneal air.    BONES/SOFT TISSUES: No acute osseous abnormality. Multiple chronic right-sided rib fractures and chronic fracture deformity of the left pubic bone.    OTHER: Atherosclerotic vascular calcification.      IMPRESSION:    1. Moderate diffuse colonic wall thickening, with associated pericolic inflammation, compatible with acute colitis.    2. Small volume abdominopelvic ascites.    3. Small left inguinal hernia containing a short segment of nonobstructed sigmoid colon.    4. Small bilateral pleural effusions with bibasilar compressive atelectasis.              ELMER BEST MD; Attending Radiologist  This document has been electronically signed. Dec 23 2020  4:03PM (20 @ 15:23)      CARDIOLOGY TESTING  12 Lead ECG:   Ventricular Rate 63 BPM    Atrial Rate 63 BPM    P-R Interval 194 ms    QRS Duration 122 ms    Q-T Interval 430 ms    QTC Calculation(Bazett) 440 ms    P Axis 76 degrees    R Axis 265 degrees    T Axis 127 degrees    Diagnosis Line Atrial-paced rhythm  Anterolateral infarct , age undetermined  Abnormal ECG    Reconfirmed by MARIANELA JEONG MD ([id number]) on 2020 12:17:41 PM (20 @ 11:53)      MEDICATIONS  aspirin  chewable 81 Oral daily  atorvastatin Oral Tab/Cap - Peds 40 Oral daily  chlorhexidine 4% Liquid 1 Topical <User Schedule>  cholecalciferol 1000 Oral daily  ciprofloxacin   IVPB 400 IV Intermittent every 12 hours  clonazePAM  Tablet 0.25 Oral two times a day  cyanocobalamin 1000 Oral daily  folic acid 2 Oral daily  heparin   Injectable 5000 SubCutaneous every 12 hours  hydrocortisone sodium succinate Injectable 50 IV Push three times a day  lamoTRIgine 200 Oral two times a day  metroNIDAZOLE  IVPB 500 IV Intermittent once  metroNIDAZOLE  IVPB 500 IV Intermittent every 8 hours  ondansetron Injectable 4 IV Push once  pantoprazole    Tablet 40 Oral before breakfast  sodium chloride 0.9%. 1000 IV Continuous <Continuous>      Weight  Weight (kg): 63.1 (20 @ 19:25)    ANTIBIOTICS:  ciprofloxacin   IVPB 400 milliGRAM(s) IV Intermittent every 12 hours  metroNIDAZOLE  IVPB 500 milliGRAM(s) IV Intermittent once  metroNIDAZOLE  IVPB 500 milliGRAM(s) IV Intermittent every 8 hours      ALLERGIES:  Lasix (Blisters)    
 Patient is a 70y old  Male who presents with a chief complaint of c/o; abd pain - 1 day     T(F): 100.4 (12-23-20 @ 11:47), Max: 100.4 (12-23-20 @ 11:47)  HR: 72 (12-23-20 @ 15:43)  BP: 114/57 (12-23-20 @ 15:43)  RR: 18 (12-23-20 @ 15:43)  SpO2: 99% (12-23-20 @ 15:43) (92% - 100%)    PHYSICAL EXAM:  GENERAL: NAD, well-groomed, well-developed  HEAD:  Atraumatic, Normocephalic  EYES: EOMI, PERRLA, conjunctiva and sclera clear  ENMT: No tonsillar erythema, exudates, or enlargement; Moist mucous membranes, Good dentition, No lesions  NECK: Supple, No JVD, Normal thyroid  NERVOUS SYSTEM:  Alert & Oriented X3, Good concentration; Motor Strength 5/5 B/L upper and lower extremities; DTRs 2+ intact and symmetric  CHEST/LUNG: Clear to percussion bilaterally; No rales, rhonchi, wheezing, or rubs  HEART: Regular rate and rhythm; No murmurs, rubs, or gallops  ABDOMEN: Soft, Nontender, Nondistended; Bowel sounds present  EXTREMITIES:  2+ Peripheral Pulses, No clubbing, cyanosis, or edema  LYMPH: No lymphadenopathy noted  SKIN: No rashes or lesions    labs  12-23    116<LL>  |  81<L>  |  14  ----------------------------<  119<H>  5.0   |  25  |  1.0    Ca    8.4<L>      23 Dec 2020 12:15    TPro  5.2<L>  /  Alb  3.1<L>  /  TBili  0.8  /  DBili  x   /  AST  24  /  ALT  11  /  AlkPhos  85  12-23                          10.6   24.18 )-----------( 205      ( 23 Dec 2020 12:15 )             30.9         PT/INR - ( 23 Dec 2020 12:15 )   PT: 14.10 sec;   INR: 1.23 ratio         PTT - ( 23 Dec 2020 12:15 )  PTT:29.0 sec      radiology    ALBUTerol    90 MICROgram(s) HFA Inhaler 2 Puff(s) Inhalation every 6 hours PRN  aspirin  chewable 81 milliGRAM(s) Oral daily  atorvastatin Oral Tab/Cap - Peds 40 milliGRAM(s) Oral daily  cholecalciferol 1000 Unit(s) Oral daily  ciprofloxacin   IVPB 400 milliGRAM(s) IV Intermittent every 12 hours  clonazePAM  Tablet 0.25 milliGRAM(s) Oral two times a day  cyanocobalamin 1000 MICROGram(s) Oral daily  folic acid 2 milliGRAM(s) Oral daily  lamoTRIgine 200 milliGRAM(s) Oral two times a day  metroNIDAZOLE  IVPB 500 milliGRAM(s) IV Intermittent once  metroNIDAZOLE  IVPB 500 milliGRAM(s) IV Intermittent every 8 hours  predniSONE   Tablet 5 milliGRAM(s) Oral daily  sodium chloride 0.9%. 1000 milliLiter(s) IV Continuous <Continuous>  
NEPHROLOGY CONSULTATION NOTE    Pt is a 70M w/ PMH  CHF, COPD, CAD, epilepsy admitted for UTI sepsis, hypotension, and hyponatremia. Per patient, he developed dysuria, frequency and decreased urinary output 2-3 days ago. Shortly after that he developed some confusion. Recent admission with frequent falls and c.diff. Pt reports chronic hyponatremia (high 120s). Last BM this AM normal consistency and color.  Denies diarrhea, abdominal pain, and fevers. Denies HA, dizziness fever/chills, cough, rhinorrhea, SOB, chest pain, abdominal pain, change in BM.     Pt seen for hyponatremia. Good po intake. On  cc/hr    PAST MEDICAL & SURGICAL HISTORY:  Epilepsy    CAD (coronary artery disease)    COPD, mild    Psychoses  paranoia    CHF (congestive heart failure)    Cardiac pacemaker  with defibrilator      Allergies:  Lasix (Blisters)    Home Medications Reviewed  Hospital Medications:   MEDICATIONS  (STANDING):  aspirin  chewable 81 milliGRAM(s) Oral daily  atorvastatin Oral Tab/Cap - Peds 40 milliGRAM(s) Oral daily  cefepime   IVPB 1000 milliGRAM(s) IV Intermittent every 8 hours  chlorhexidine 4% Liquid 1 Application(s) Topical <User Schedule>  cholecalciferol 1000 Unit(s) Oral daily  clonazePAM  Tablet 0.25 milliGRAM(s) Oral two times a day  cyanocobalamin 1000 MICROGram(s) Oral daily  folic acid 2 milliGRAM(s) Oral daily  heparin   Injectable 5000 Unit(s) SubCutaneous every 12 hours  hydrocortisone sodium succinate Injectable 50 milliGRAM(s) IV Push three times a day  lamoTRIgine 200 milliGRAM(s) Oral two times a day  ondansetron Injectable 4 milliGRAM(s) IV Push once  pantoprazole    Tablet 40 milliGRAM(s) Oral before breakfast  sodium chloride 1 Gram(s) Oral three times a day  sodium chloride 0.9%. 1000 milliLiter(s) (100 mL/Hr) IV Continuous <Continuous>  vancomycin    Solution 125 milliGRAM(s) Oral daily      SOCIAL HISTORY:  Denies ETOH,Smoking,   FAMILY HISTORY:        REVIEW OF SYSTEMS:  CONSTITUTIONAL: No weakness, fevers or chills  RESPIRATORY: No cough, No shortness of breath  CARDIOVASCULAR: No chest pain or palpitations.  GASTROINTESTINAL: No abdominal or epigastric pain. No nausea, vomiting, or hematemesis;   No diarrhea  GENITOURINARY: has dysuria, frequency,   NEUROLOGICAL: No numbness or weakness  VASCULAR: No bilateral lower extremity edema.   All other review of systems is negative unless indicated above.    VITALS:  T(F): 97.1 (20 @ 05:00), Max: 98.8 (20 @ 19:25)  HR: 60 (20 @ 05:00)  BP: 103/54 (20 @ 05:00)  RR: 18 (20 @ 05:00)  SpO2: 95% (20 @ 08:29)    Height (cm): 162.6 (:25)  Weight (kg): 63.1 (:25)  BMI (kg/m2): 23.9 (:25)  BSA (m2): 1.68 (:25)    I&O's Detail        PHYSICAL EXAM:  Constitutional: NAD  HEENT: anicteric sclera, oropharynx clear, MMM  Neck: No JVD  Respiratory: CTAB, no wheezes, rales or rhonchi  Cardiovascular: S1, S2, RRR  Gastrointestinal: BS+, soft, NT/ND  Extremities: No cyanosis or clubbing. No peripheral edema  Neurological: Awake alert  Psychiatric: Normal mood, normal affect  : No CVA tenderness. No almodovar.   Skin: No rashes  Vascular Access:    LABS:      118<LL>  |  89<L>  |  11  ----------------------------<  139<H>  4.5   |  19  |  0.8  SODIUM TREND:  Sodium 118 [ @ 08:29]  Sodium 116 [ @ 03:20]  Sodium 116 [ @ 12:15]    Ca    8.0<L>      24 Dec 2020 08:29    TPro  4.6<L>  /  Alb  2.7<L>  /  TBili  0.4  /  DBili      /  AST  28  /  ALT  9   /  AlkPhos  64      Creatinine Trend: 0.8 <--, 0.7 <--, 1.0 <--                        10.2   17.89 )-----------( 168      ( 24 Dec 2020 08:29 )             30.8     Urine Studies:  Urinalysis Basic - ( 23 Dec 2020 12:15 )    Color: Yellow / Appearance: Cloudy / S.025 / pH:   Gluc:  / Ketone: 40  / Bili: Negative / Urobili: 0.2   Blood:  / Protein: 100 / Nitrite: Positive   Leuk Esterase: Large / RBC: 11-25 /HPF / WBC >50 /HPF   Sq Epi:  / Non Sq Epi: Few /HPF / Bacteria: TNTC /HPF                RADIOLOGY & ADDITIONAL STUDIES:      < from: CT Abdomen and Pelvis w/ IV Cont (20 @ 15:23) >    KIDNEYS: A 1.1 cm left renal hypodensity is incompletely characterized. No hydronephrosis.    ABDOMINOPELVIC NODES: Unremarkable.    PELVIC ORGANS: Unremarkable.    PERITONEUM/MESENTERY/BOWEL: Small left inguinal hernia containing a short segment of nonobstructed sigmoid colon. Moderate diffuse colonic wall thickening is noted, with associated pericolonic inflammation, compatible with acute colitis. Small volume abdominopelvic ascites. No evidence of bowel obstruction. No free intraperitoneal air.    BONES/SOFT TISSUES: No acute osseous abnormality. Multiple chronic right-sided rib fractures and chronic fracture deformity of the left pubic bone.    OTHER: Atherosclerotic vascular calcification.      IMPRESSION:    1. Moderate diffuse colonic wall thickening, with associated pericolic inflammation, compatible with acute colitis.    2. Small volume abdominopelvic ascites.    3. Small left inguinal hernia containing a short segment of nonobstructed sigmoid colon.    4. Small bilateral pleural effusions with bibasilar compressive atelectasis.    < end of copied text >

## 2020-12-24 NOTE — PHYSICAL THERAPY INITIAL EVALUATION ADULT - ADDITIONAL COMMENTS
As per patient, resides with spouse in private home. +5 steps to enter, 1 flight of stairs to bedroom and bathroom, but patient says he has been staying on 1st floor only for approx 2 weeks. Ambulates with rolling walker and assist as needed at baseline.

## 2020-12-24 NOTE — PHYSICAL THERAPY INITIAL EVALUATION ADULT - GENERAL OBSERVATIONS, REHAB EVAL
8:30 - 8:55. Chart reviewed. Patient available to be seen for physical therapy, confirmed with nurse. Patient encountered semi-reclined in bed, +IV, +tele. Denies pain at rest, says he feels "weak from UTI," but is agreeable for PT evaluation now.

## 2020-12-24 NOTE — PHYSICAL THERAPY INITIAL EVALUATION ADULT - GAIT DEVIATIONS NOTED, PT EVAL
stooped posture, incomplete foot clearance/decreased lebron/increased time in double stance/decreased step length/decreased weight-shifting ability

## 2020-12-24 NOTE — PROGRESS NOTE ADULT - ASSESSMENT
70M w/ PMH  CHF, COPD, CAD, epilepsy admitted for UTI sepsis, hypotension, and hyponatremia. Per patient, he developed dysuria, frequency and decreased urinary output 2-3 days ago. Shortly after that he dev    #Severe sepsis, presumed due to uti +/- colitis  ?recently treated for cdiff, will clarify further with family  cont cipro, flagyl for now  gi pcr  check bcx, ucx  bp improved s/p ivf; cont ns 100 cc/hr  c/b hyponatremia, check am cortisol, tsh; r/o adrenal insufficiency  on chronic prednisone, start stress dose steroids solu cortef 50 tid  id, renal consult  hold antipsychotic .  70M PMHx HFpEF suspected, COPD, CAD, seizure disorder here with severe sepsis, present on admission, presumed due to uti.    #Severe sepsis, presumed due to uti  abx change to cefepime  vanco po 125 for cdiff ppx  appreciate id  f/u bcx, ucx  cont ns 100 cc/hr  #Hyponatremia, suspect hypovolemic  ns as above  f/u am cortisol, tsh, r/o adrenal insufficiency  solu cortef 50 tid  f/u renal  hold anitpsychotic  repeat bmp in pm  #COPD  proventil prn  #CAD  lipitor 40  asa  #Seizure disorder  lamictal 200 bid  #HFpEF, suspected; chronic  lasix on hold  cxr noted  #DVT ppx  subq hep    #Progress Note Handoff:  Pending (specify):  Consults_________, Tests________, Test Results_______, Other___na______  Family discussion:d/w pt at bedside re: treatment plan, primary dx  Disposition: Home___/SNF___/Other________/Unknown at this time___x_____

## 2020-12-24 NOTE — CONSULT NOTE ADULT - ASSESSMENT
Pt with CHF, COPD, epilepsy (on Lamotrigine) recent C. diff colitis admitted with UTI found to have hyponatremia of 116.    Hyponatremia chronic - as per pt, Na usually in high 120  asymptomatic now  - likely multifactorial - sepsis with volume depletion, recent diarrhea, possible SIADH (lamotrigine), CHF  - need Urine Osm and serum OSm, urine Na and serum uric acid to r/o SIADH  - agree with NS 75 cc/hr-100 cc/hr; Na twice a day; if NA starts to decrease D/C IVF  - cont NaCL 1 tab tid  - free water restrict 1 l daily  ->118 today  check TSH cortisol level  CT abd noted small pl effusions and ascites    UTI - on Cefepime  no hydro on CT    COlitis - on po Vanco    Will follow  Thank you  
ASSESSMENT  70M w/ PMH  CHF, COPD, CAD, epilepsy admitted for dysuria    IMPRESSION  #Sepsis on admission (T>101F, WBC>12) secondary to UTI/Cystitis  - CT Abdomen and Pelvis w/ IV Cont (12.23.20 @ 15:23):  Moderate diffuse colonic wall thickening, with associated pericolic inflammation, compatible with acute colitis. Small volume abdominopelvic ascites. Small left inguinal hernia containing a short segment of nonobstructed sigmoid colon. Small bilateral pleural effusions with bibasilar compressive atelectasis.    #Hx of C diff infection - currently not having diarrhea  #CAD  #COPD  #Epilepsy   #Hyponatremia   #Abx allergy: Lasix (Blisters)      RECOMMENDATIONS  - stop cipro/flagyl  - start cefepime 1g q 8 hours for UTI  - colitis may be from recent C diff infection; currently without diarrhea  - can give PO vancomycin 125 mg daily for prophylaxis  - follow-up urine cultures and blood cultures    Please call or message on Microsoft Teams if with any questions.  Spectra [phone]

## 2020-12-25 LAB
ANION GAP SERPL CALC-SCNC: 7 MMOL/L — SIGNIFICANT CHANGE UP (ref 7–14)
BASOPHILS # BLD AUTO: 0.01 K/UL — SIGNIFICANT CHANGE UP (ref 0–0.2)
BASOPHILS NFR BLD AUTO: 0.1 % — SIGNIFICANT CHANGE UP (ref 0–1)
BUN SERPL-MCNC: 16 MG/DL — SIGNIFICANT CHANGE UP (ref 10–20)
CALCIUM SERPL-MCNC: 7.4 MG/DL — LOW (ref 8.5–10.1)
CHLORIDE SERPL-SCNC: 100 MMOL/L — SIGNIFICANT CHANGE UP (ref 98–110)
CO2 SERPL-SCNC: 22 MMOL/L — SIGNIFICANT CHANGE UP (ref 17–32)
CORTIS AM PEAK SERPL-MCNC: 71 UG/DL — HIGH (ref 6–18.4)
CREAT SERPL-MCNC: 0.8 MG/DL — SIGNIFICANT CHANGE UP (ref 0.7–1.5)
CULTURE RESULTS: SIGNIFICANT CHANGE UP
EOSINOPHIL # BLD AUTO: 0 K/UL — SIGNIFICANT CHANGE UP (ref 0–0.7)
EOSINOPHIL NFR BLD AUTO: 0 % — SIGNIFICANT CHANGE UP (ref 0–8)
GLUCOSE SERPL-MCNC: 158 MG/DL — HIGH (ref 70–99)
HCT VFR BLD CALC: 25.6 % — LOW (ref 42–52)
HGB BLD-MCNC: 8.5 G/DL — LOW (ref 14–18)
IMM GRANULOCYTES NFR BLD AUTO: 0.9 % — HIGH (ref 0.1–0.3)
LYMPHOCYTES # BLD AUTO: 0.24 K/UL — LOW (ref 1.2–3.4)
LYMPHOCYTES # BLD AUTO: 1.5 % — LOW (ref 20.5–51.1)
MAGNESIUM SERPL-MCNC: 1.8 MG/DL — SIGNIFICANT CHANGE UP (ref 1.8–2.4)
MCHC RBC-ENTMCNC: 32.6 PG — HIGH (ref 27–31)
MCHC RBC-ENTMCNC: 33.2 G/DL — SIGNIFICANT CHANGE UP (ref 32–37)
MCV RBC AUTO: 98.1 FL — HIGH (ref 80–94)
MONOCYTES # BLD AUTO: 0.48 K/UL — SIGNIFICANT CHANGE UP (ref 0.1–0.6)
MONOCYTES NFR BLD AUTO: 3 % — SIGNIFICANT CHANGE UP (ref 1.7–9.3)
NEUTROPHILS # BLD AUTO: 14.98 K/UL — HIGH (ref 1.4–6.5)
NEUTROPHILS NFR BLD AUTO: 94.5 % — HIGH (ref 42.2–75.2)
NRBC # BLD: 0 /100 WBCS — SIGNIFICANT CHANGE UP (ref 0–0)
OSMOLALITY SERPL: 270 MOS/KG — LOW (ref 280–301)
PHOSPHATE SERPL-MCNC: 2.5 MG/DL — SIGNIFICANT CHANGE UP (ref 2.1–4.9)
PLATELET # BLD AUTO: 207 K/UL — SIGNIFICANT CHANGE UP (ref 130–400)
POTASSIUM SERPL-MCNC: 4 MMOL/L — SIGNIFICANT CHANGE UP (ref 3.5–5)
POTASSIUM SERPL-SCNC: 4 MMOL/L — SIGNIFICANT CHANGE UP (ref 3.5–5)
RBC # BLD: 2.61 M/UL — LOW (ref 4.7–6.1)
RBC # FLD: 14 % — SIGNIFICANT CHANGE UP (ref 11.5–14.5)
SODIUM SERPL-SCNC: 129 MMOL/L — LOW (ref 135–146)
SPECIMEN SOURCE: SIGNIFICANT CHANGE UP
TSH SERPL-MCNC: 3.96 UIU/ML — SIGNIFICANT CHANGE UP (ref 0.27–4.2)
WBC # BLD: 15.85 K/UL — HIGH (ref 4.8–10.8)
WBC # FLD AUTO: 15.85 K/UL — HIGH (ref 4.8–10.8)

## 2020-12-25 PROCEDURE — 99233 SBSQ HOSP IP/OBS HIGH 50: CPT

## 2020-12-25 RX ORDER — PALIPERIDONE 1.5 MG/1
3 TABLET, EXTENDED RELEASE ORAL DAILY
Refills: 0 | Status: DISCONTINUED | OUTPATIENT
Start: 2020-12-25 | End: 2020-12-29

## 2020-12-25 RX ORDER — SODIUM CHLORIDE 9 MG/ML
1000 INJECTION, SOLUTION INTRAVENOUS
Refills: 0 | Status: DISCONTINUED | OUTPATIENT
Start: 2020-12-25 | End: 2020-12-26

## 2020-12-25 RX ORDER — CARVEDILOL PHOSPHATE 80 MG/1
6.25 CAPSULE, EXTENDED RELEASE ORAL EVERY 12 HOURS
Refills: 0 | Status: DISCONTINUED | OUTPATIENT
Start: 2020-12-25 | End: 2020-12-27

## 2020-12-25 RX ORDER — SIMETHICONE 80 MG/1
80 TABLET, CHEWABLE ORAL
Refills: 0 | Status: DISCONTINUED | OUTPATIENT
Start: 2020-12-25 | End: 2020-12-29

## 2020-12-25 RX ORDER — MOMETASONE FUROATE 50 UG/1
2 SPRAY NASAL DAILY
Refills: 0 | Status: DISCONTINUED | OUTPATIENT
Start: 2020-12-25 | End: 2020-12-29

## 2020-12-25 RX ADMIN — PALIPERIDONE 3 MILLIGRAM(S): 1.5 TABLET, EXTENDED RELEASE ORAL at 16:41

## 2020-12-25 RX ADMIN — Medication 50 MILLIGRAM(S): at 05:19

## 2020-12-25 RX ADMIN — Medication 125 MILLIGRAM(S): at 11:25

## 2020-12-25 RX ADMIN — LAMOTRIGINE 200 MILLIGRAM(S): 25 TABLET, ORALLY DISINTEGRATING ORAL at 05:39

## 2020-12-25 RX ADMIN — HEPARIN SODIUM 5000 UNIT(S): 5000 INJECTION INTRAVENOUS; SUBCUTANEOUS at 17:26

## 2020-12-25 RX ADMIN — SODIUM CHLORIDE 1 GRAM(S): 9 INJECTION INTRAMUSCULAR; INTRAVENOUS; SUBCUTANEOUS at 13:10

## 2020-12-25 RX ADMIN — SIMETHICONE 80 MILLIGRAM(S): 80 TABLET, CHEWABLE ORAL at 05:39

## 2020-12-25 RX ADMIN — CARVEDILOL PHOSPHATE 6.25 MILLIGRAM(S): 80 CAPSULE, EXTENDED RELEASE ORAL at 17:26

## 2020-12-25 RX ADMIN — SODIUM CHLORIDE 1 GRAM(S): 9 INJECTION INTRAMUSCULAR; INTRAVENOUS; SUBCUTANEOUS at 05:39

## 2020-12-25 RX ADMIN — SODIUM CHLORIDE 75 MILLILITER(S): 9 INJECTION, SOLUTION INTRAVENOUS at 11:46

## 2020-12-25 RX ADMIN — CEFEPIME 100 MILLIGRAM(S): 1 INJECTION, POWDER, FOR SOLUTION INTRAMUSCULAR; INTRAVENOUS at 13:10

## 2020-12-25 RX ADMIN — HEPARIN SODIUM 5000 UNIT(S): 5000 INJECTION INTRAVENOUS; SUBCUTANEOUS at 05:39

## 2020-12-25 RX ADMIN — Medication 81 MILLIGRAM(S): at 11:25

## 2020-12-25 RX ADMIN — PREGABALIN 1000 MICROGRAM(S): 225 CAPSULE ORAL at 13:10

## 2020-12-25 RX ADMIN — PANTOPRAZOLE SODIUM 40 MILLIGRAM(S): 20 TABLET, DELAYED RELEASE ORAL at 05:39

## 2020-12-25 RX ADMIN — Medication 1000 UNIT(S): at 11:25

## 2020-12-25 RX ADMIN — Medication 5 MILLIGRAM(S): at 11:24

## 2020-12-25 RX ADMIN — CEFEPIME 100 MILLIGRAM(S): 1 INJECTION, POWDER, FOR SOLUTION INTRAMUSCULAR; INTRAVENOUS at 21:19

## 2020-12-25 RX ADMIN — Medication 0.25 MILLIGRAM(S): at 05:39

## 2020-12-25 RX ADMIN — LAMOTRIGINE 200 MILLIGRAM(S): 25 TABLET, ORALLY DISINTEGRATING ORAL at 17:25

## 2020-12-25 RX ADMIN — CEFEPIME 100 MILLIGRAM(S): 1 INJECTION, POWDER, FOR SOLUTION INTRAMUSCULAR; INTRAVENOUS at 05:19

## 2020-12-25 RX ADMIN — Medication 0.25 MILLIGRAM(S): at 17:25

## 2020-12-25 RX ADMIN — CHLORHEXIDINE GLUCONATE 1 APPLICATION(S): 213 SOLUTION TOPICAL at 05:19

## 2020-12-25 RX ADMIN — Medication 2 MILLIGRAM(S): at 11:24

## 2020-12-25 RX ADMIN — ATORVASTATIN CALCIUM 40 MILLIGRAM(S): 80 TABLET, FILM COATED ORAL at 11:25

## 2020-12-25 NOTE — PROGRESS NOTE ADULT - SUBJECTIVE AND OBJECTIVE BOX
Subjective:       Patient seen and examined at bedside.   No acute events overnight   Pt afebrile , wbc trending down 24> 17 , will follow am cbc  Pt denies fever, chill, chest pain, abdominal pain , nausea or vomiting   Pt denies diarrhea.          Vital Signs Last 24 Hrs  T(C): 37 (25 Dec 2020 05:15), Max: 37 (25 Dec 2020 05:15)  T(F): 98.6 (25 Dec 2020 05:15), Max: 98.6 (25 Dec 2020 05:15)  HR: 68 (25 Dec 2020 05:15) (67 - 69)  BP: 121/67 (25 Dec 2020 05:15) (100/56 - 121/67)  BP(mean): --  RR: 16 (25 Dec 2020 05:15) (16 - 16)  SpO2: --    General Appearance: Appears well, NAD  Neck: Supple  Chest: Equal expansion bilaterally, equal breath sounds  CV: Pulse regular presently  Abdomen: Soft, NT/ND,+bs,  no rebound/gaurding  Extremities: Grossly symmetric, no calf tend b/l        I&O's Summary    24 Dec 2020 07:01  -  25 Dec 2020 07:00  --------------------------------------------------------  IN: 0 mL / OUT: 400 mL / NET: -400 mL      I&O's Detail    24 Dec 2020 07:  -  25 Dec 2020 07:00  --------------------------------------------------------  IN:  Total IN: 0 mL    OUT:    Voided (mL): 400 mL  Total OUT: 400 mL    Total NET: -400 mL          MEDICATIONS  (STANDING):  aspirin  chewable 81 milliGRAM(s) Oral daily  atorvastatin Oral Tab/Cap - Peds 40 milliGRAM(s) Oral daily  cefepime   IVPB 1000 milliGRAM(s) IV Intermittent every 8 hours  chlorhexidine 4% Liquid 1 Application(s) Topical <User Schedule>  cholecalciferol 1000 Unit(s) Oral daily  clonazePAM  Tablet 0.25 milliGRAM(s) Oral two times a day  cyanocobalamin 1000 MICROGram(s) Oral daily  folic acid 2 milliGRAM(s) Oral daily  heparin   Injectable 5000 Unit(s) SubCutaneous every 12 hours  lamoTRIgine 200 milliGRAM(s) Oral two times a day  ondansetron Injectable 4 milliGRAM(s) IV Push once  pantoprazole    Tablet 40 milliGRAM(s) Oral before breakfast  predniSONE   Tablet 5 milliGRAM(s) Oral daily  sodium chloride 1 Gram(s) Oral three times a day  sodium chloride 0.9%. 1000 milliLiter(s) (100 mL/Hr) IV Continuous <Continuous>  vancomycin    Solution 125 milliGRAM(s) Oral daily    MEDICATIONS  (PRN):  ALBUTerol    90 MICROgram(s) HFA Inhaler 2 Puff(s) Inhalation every 6 hours PRN Shortness of Breath and/or Wheezing  simethicone 80 milliGRAM(s) Chew four times a day PRN Gas      LABS:                        10.2   17.89 )-----------( 168      ( 24 Dec 2020 08:29 )             30.8     12-24    125<L>  |  93<L>  |  14  ----------------------------<  138<H>  4.5   |  21  |  0.9    Ca    8.1<L>      24 Dec 2020 15:54    TPro  4.6<L>  /  Alb  2.7<L>  /  TBili  0.4  /  DBili  x   /  AST  28  /  ALT  9   /  AlkPhos  64  12-24    PT/INR - ( 23 Dec 2020 12:15 )   PT: 14.10 sec;   INR: 1.23 ratio         PTT - ( 23 Dec 2020 12:15 )  PTT:29.0 sec  Urinalysis Basic - ( 23 Dec 2020 12:15 )    Color: Yellow / Appearance: Cloudy / S.025 / pH: x  Gluc: x / Ketone: 40  / Bili: Negative / Urobili: 0.2   Blood: x / Protein: 100 / Nitrite: Positive   Leuk Esterase: Large / RBC: 11-25 /HPF / WBC >50 /HPF   Sq Epi: x / Non Sq Epi: Few /HPF / Bacteria: TNTC /HPF        RADIOLOGY & ADDITIONAL STUDIES:    < from: CT Abdomen and Pelvis w/ IV Cont (20 @ 15:23) >    IMPRESSION:    1. Moderate diffuse colonic wall thickening, with associated pericolic inflammation, compatible with acute colitis.    2. Small volume abdominopelvic ascites.    3. Small left inguinal hernia containing a short segment of nonobstructed sigmoid colon.    4. Small bilateral pleural effusions with bibasilar compressive atelectasis.              ELMER BEST MD; Attending Radiologist  This document has been electronically signed. Dec 23 2020  4:03PM    < end of copied text >

## 2020-12-25 NOTE — PROGRESS NOTE ADULT - ATTENDING COMMENTS
#Severe sepsis, presumed due to uti  improving  cefepime  ucx kleb, bcx ntd  vanco po 125 for cdiff ppx  appreciate id  nc change to half ns 75 cc/hr  #Hyponatremia, consistent with hypovolemia  improving, avoid overcorrection  half ns as above  no evidence adrenal insufficency, d/c solu cortef, change to home dose prednisone 5  d/w wife, will resume invega 3  repeat bmp in pm

## 2020-12-25 NOTE — PROGRESS NOTE ADULT - ASSESSMENT
70M PMHx HFpEF suspected, COPD, CAD, seizure disorder here with severe sepsis, present on admission, presumed due to uti.    #Severe sepsis, presumed due to uti  abx change to cefepime  vanco po 125 for cdiff ppx  appreciate id  f/u bcx, ucx- prelim gram neg caity  cont ns 100 cc/hr  #Hyponatremia, suspect hypovolemic  ns as above  Na improving 116> 118>125  Am cortisol -71, tsh- 3.96, r/o adrenal insufficiency  solu cortef 50 tid  Renal rec appreciated   hold anitpsychotic  - free water restrict 1 l daily  #COPD  proventil prn  #CAD  lipitor 40  asa  #Seizure disorder  lamictal 200 bid  #HFpEF, suspected; chronic  lasix on hold  cxr noted  #DVT ppx  subq hep    #Progress Note Handoff:  Pending (specify):  Consults_________, Tests________, Test Results_______, Other___na______  Family discussion:d/w pt at bedside re: treatment plan, primary dx  Disposition: Home___/SNF___/Other________/Unknown at this time___x_____   70M PMHx HFpEF suspected, COPD, CAD, bullous pemphigoid, psychosis, seizure disorder here with severe sepsis, present on admission, presumed due to uti.    #Severe sepsis, presumed due to uti  abx change to cefepime  vanco po 125 for cdiff ppx  appreciate id  f/u bcx, ucx- prelim gram neg caity  cont ns 100 cc/hr  #Hyponatremia, suspect hypovolemic  ns as above  Na improving 116> 118>125  Am cortisol -71, tsh- 3.96, r/o adrenal insufficiency  solu cortef 50 tid  Renal rec appreciated   hold anitpsychotic  - free water restrict 1 l daily  #Bullous pemphigoid  prednisone 5  mtx 2.5 qweek  #Psychotic disorder  invega 3  #COPD  proventil prn  #CAD  lipitor 40  asa  #Seizure disorder  lamictal 200 bid  #HFpEF, suspected; chronic  lasix on hold  cxr noted  #DVT ppx  subq hep    #Progress Note Handoff:  Pending (specify):  Consults_________, Tests________, Test Results_______, Other___na______  Family discussion:d/w pt at bedside re: treatment plan, primary dx  Disposition: Home___/SNF___/Other________/Unknown at this time___x_____

## 2020-12-26 LAB
-  AMIKACIN: SIGNIFICANT CHANGE UP
-  AMOXICILLIN/CLAVULANIC ACID: SIGNIFICANT CHANGE UP
-  AMPICILLIN/SULBACTAM: SIGNIFICANT CHANGE UP
-  AMPICILLIN: SIGNIFICANT CHANGE UP
-  AZTREONAM: SIGNIFICANT CHANGE UP
-  CEFAZOLIN: SIGNIFICANT CHANGE UP
-  CEFEPIME: SIGNIFICANT CHANGE UP
-  CEFOXITIN: SIGNIFICANT CHANGE UP
-  CEFTRIAXONE: SIGNIFICANT CHANGE UP
-  CIPROFLOXACIN: SIGNIFICANT CHANGE UP
-  ERTAPENEM: SIGNIFICANT CHANGE UP
-  GENTAMICIN: SIGNIFICANT CHANGE UP
-  IMIPENEM: SIGNIFICANT CHANGE UP
-  LEVOFLOXACIN: SIGNIFICANT CHANGE UP
-  MEROPENEM: SIGNIFICANT CHANGE UP
-  NITROFURANTOIN: SIGNIFICANT CHANGE UP
-  PIPERACILLIN/TAZOBACTAM: SIGNIFICANT CHANGE UP
-  TIGECYCLINE: SIGNIFICANT CHANGE UP
-  TOBRAMYCIN: SIGNIFICANT CHANGE UP
-  TRIMETHOPRIM/SULFAMETHOXAZOLE: SIGNIFICANT CHANGE UP
ANION GAP SERPL CALC-SCNC: 6 MMOL/L — LOW (ref 7–14)
BUN SERPL-MCNC: 17 MG/DL — SIGNIFICANT CHANGE UP (ref 10–20)
CALCIUM SERPL-MCNC: 7.6 MG/DL — LOW (ref 8.5–10.1)
CHLORIDE SERPL-SCNC: 99 MMOL/L — SIGNIFICANT CHANGE UP (ref 98–110)
CO2 SERPL-SCNC: 22 MMOL/L — SIGNIFICANT CHANGE UP (ref 17–32)
CREAT SERPL-MCNC: 0.9 MG/DL — SIGNIFICANT CHANGE UP (ref 0.7–1.5)
CULTURE RESULTS: SIGNIFICANT CHANGE UP
GLUCOSE SERPL-MCNC: 93 MG/DL — SIGNIFICANT CHANGE UP (ref 70–99)
HCT VFR BLD CALC: 25.9 % — LOW (ref 42–52)
HGB BLD-MCNC: 8.5 G/DL — LOW (ref 14–18)
MAGNESIUM SERPL-MCNC: 1.8 MG/DL — SIGNIFICANT CHANGE UP (ref 1.8–2.4)
MCHC RBC-ENTMCNC: 32.8 G/DL — SIGNIFICANT CHANGE UP (ref 32–37)
MCHC RBC-ENTMCNC: 32.8 PG — HIGH (ref 27–31)
MCV RBC AUTO: 100 FL — HIGH (ref 80–94)
METHOD TYPE: SIGNIFICANT CHANGE UP
NRBC # BLD: 0 /100 WBCS — SIGNIFICANT CHANGE UP (ref 0–0)
ORGANISM # SPEC MICROSCOPIC CNT: SIGNIFICANT CHANGE UP
ORGANISM # SPEC MICROSCOPIC CNT: SIGNIFICANT CHANGE UP
PHOSPHATE SERPL-MCNC: 1.8 MG/DL — LOW (ref 2.1–4.9)
PLATELET # BLD AUTO: 217 K/UL — SIGNIFICANT CHANGE UP (ref 130–400)
POTASSIUM SERPL-MCNC: 3.5 MMOL/L — SIGNIFICANT CHANGE UP (ref 3.5–5)
POTASSIUM SERPL-SCNC: 3.5 MMOL/L — SIGNIFICANT CHANGE UP (ref 3.5–5)
RBC # BLD: 2.59 M/UL — LOW (ref 4.7–6.1)
RBC # FLD: 14.5 % — SIGNIFICANT CHANGE UP (ref 11.5–14.5)
SODIUM SERPL-SCNC: 127 MMOL/L — LOW (ref 135–146)
SPECIMEN SOURCE: SIGNIFICANT CHANGE UP
WBC # BLD: 14.86 K/UL — HIGH (ref 4.8–10.8)
WBC # FLD AUTO: 14.86 K/UL — HIGH (ref 4.8–10.8)

## 2020-12-26 PROCEDURE — 99233 SBSQ HOSP IP/OBS HIGH 50: CPT

## 2020-12-26 RX ORDER — CEFTRIAXONE 500 MG/1
1000 INJECTION, POWDER, FOR SOLUTION INTRAMUSCULAR; INTRAVENOUS EVERY 24 HOURS
Refills: 0 | Status: DISCONTINUED | OUTPATIENT
Start: 2020-12-26 | End: 2020-12-29

## 2020-12-26 RX ORDER — SODIUM CHLORIDE 9 MG/ML
1000 INJECTION INTRAMUSCULAR; INTRAVENOUS; SUBCUTANEOUS
Refills: 0 | Status: DISCONTINUED | OUTPATIENT
Start: 2020-12-26 | End: 2020-12-26

## 2020-12-26 RX ADMIN — ATORVASTATIN CALCIUM 40 MILLIGRAM(S): 80 TABLET, FILM COATED ORAL at 11:31

## 2020-12-26 RX ADMIN — LAMOTRIGINE 200 MILLIGRAM(S): 25 TABLET, ORALLY DISINTEGRATING ORAL at 17:33

## 2020-12-26 RX ADMIN — CEFTRIAXONE 100 MILLIGRAM(S): 500 INJECTION, POWDER, FOR SOLUTION INTRAMUSCULAR; INTRAVENOUS at 13:25

## 2020-12-26 RX ADMIN — Medication 81 MILLIGRAM(S): at 11:31

## 2020-12-26 RX ADMIN — Medication 1000 UNIT(S): at 11:30

## 2020-12-26 RX ADMIN — Medication 0.25 MILLIGRAM(S): at 17:33

## 2020-12-26 RX ADMIN — Medication 62.5 MILLIMOLE(S): at 13:23

## 2020-12-26 RX ADMIN — LAMOTRIGINE 200 MILLIGRAM(S): 25 TABLET, ORALLY DISINTEGRATING ORAL at 05:18

## 2020-12-26 RX ADMIN — Medication 2 MILLIGRAM(S): at 11:30

## 2020-12-26 RX ADMIN — HEPARIN SODIUM 5000 UNIT(S): 5000 INJECTION INTRAVENOUS; SUBCUTANEOUS at 17:33

## 2020-12-26 RX ADMIN — CARVEDILOL PHOSPHATE 6.25 MILLIGRAM(S): 80 CAPSULE, EXTENDED RELEASE ORAL at 17:33

## 2020-12-26 RX ADMIN — PALIPERIDONE 3 MILLIGRAM(S): 1.5 TABLET, EXTENDED RELEASE ORAL at 11:36

## 2020-12-26 RX ADMIN — Medication 0.25 MILLIGRAM(S): at 05:19

## 2020-12-26 RX ADMIN — Medication 125 MILLIGRAM(S): at 11:32

## 2020-12-26 RX ADMIN — HEPARIN SODIUM 5000 UNIT(S): 5000 INJECTION INTRAVENOUS; SUBCUTANEOUS at 05:19

## 2020-12-26 RX ADMIN — CHLORHEXIDINE GLUCONATE 1 APPLICATION(S): 213 SOLUTION TOPICAL at 05:18

## 2020-12-26 RX ADMIN — PREGABALIN 1000 MICROGRAM(S): 225 CAPSULE ORAL at 11:31

## 2020-12-26 RX ADMIN — CARVEDILOL PHOSPHATE 6.25 MILLIGRAM(S): 80 CAPSULE, EXTENDED RELEASE ORAL at 05:18

## 2020-12-26 RX ADMIN — CEFEPIME 100 MILLIGRAM(S): 1 INJECTION, POWDER, FOR SOLUTION INTRAMUSCULAR; INTRAVENOUS at 05:18

## 2020-12-26 RX ADMIN — Medication 5 MILLIGRAM(S): at 05:18

## 2020-12-26 RX ADMIN — PANTOPRAZOLE SODIUM 40 MILLIGRAM(S): 20 TABLET, DELAYED RELEASE ORAL at 05:20

## 2020-12-26 NOTE — PROGRESS NOTE ADULT - ASSESSMENT
70M PMHx HFpEF suspected, COPD, CAD, seizure disorder here with severe sepsis, present on admission, presumed due to uti.    #Severe sepsis, presumed due to kleb uti  resolved  cefepime change to ceftriaxone  ucx kleb smith sensitive, bcx ntd  vanco po 125 for cdiff ppx  appreciate id  d/c ivf  #Hyponatremia, consistent with hypovolemia  improving, avoid overcorrection  d/c ivf  d/w wife, cont invega 3  1L fluid restrict  hold salt tab per wife request.   #COPD  proventil prn  #CAD  lipitor 40  asa  #Seizure disorder  lamictal 200 bid  #HFpEF, suspected; chronic  lasix on hold  cxr noted  #Bullous pemphigoid  prednisone 5  mtx 2.5 qweek  #DVT ppx  subq hep    #Progress Note Handoff:  Pending (specify):  Consults_________, Tests________, Test Results_______, Other___bmp______  Family discussion:d/w pt at bedside re: treatment plan, primary dx  Disposition: Home___/SNF___/Other________/Unknown at this time___x_____       70M PMHx HFpEF suspected, COPD, CAD, seizure disorder here with severe sepsis, present on admission, presumed due to uti.    #Severe sepsis, presumed due to kleb uti  resolved  cefepime change to ceftriaxone  ucx kleb smith sensitive, bcx ntd  vanco po 125 for cdiff ppx  appreciate id  d/c ivf  #Hyponatremia, consistent with hypovolemia  improving, avoid overcorrection  d/c ivf  d/w wife, cont invega 3  1L fluid restrict  hold salt tab per wife request.   #COPD  proventil prn  #Psychosis  invega 3   #CAD  lipitor 40  asa  #Seizure disorder  lamictal 200 bid  #HFpEF, suspected; chronic  lasix on hold  cxr noted  #Bullous pemphigoid  prednisone 5  mtx 2.5 qweek  #DVT ppx  subq hep    #Progress Note Handoff:  Pending (specify):  Consults_________, Tests________, Test Results_______, Other___bmp______  Family discussion:d/w pt at bedside re: treatment plan, primary dx  Disposition: Home___/SNF___/Other________/Unknown at this time___x_____

## 2020-12-26 NOTE — PROGRESS NOTE ADULT - SUBJECTIVE AND OBJECTIVE BOX
INTERVAL HPI/OVERNIGHT EVENTS:    SUBJECTIVE: Patient seen and examined at bedside.     no cp, sob, abd pain, fever  no ha, nausea, vomiting, syncope    OBJECTIVE:    VITAL SIGNS:  Vital Signs Last 24 Hrs  T(C): 36 (26 Dec 2020 04:35), Max: 36.4 (25 Dec 2020 22:21)  T(F): 96.8 (26 Dec 2020 04:35), Max: 97.6 (25 Dec 2020 22:21)  HR: 63 (26 Dec 2020 04:35) (60 - 86)  BP: 117/59 (26 Dec 2020 04:35) (117/59 - 138/64)  BP(mean): --  RR: 18 (26 Dec 2020 04:35) (18 - 18)  SpO2: --      PHYSICAL EXAM:    General: NAD  HEENT: NC/AT; PERRL, clear conjunctiva  Neck: supple  Respiratory: CTA b/l  Cardiovascular: +S1/S2; RRR  Abdomen: soft, NT/ND; +BS x4  Extremities: WWP, 2+ peripheral pulses b/l; no LE edema  Skin: normal color and turgor; no rash  Neurological:    MEDICATIONS:  MEDICATIONS  (STANDING):  aspirin  chewable 81 milliGRAM(s) Oral daily  atorvastatin Oral Tab/Cap - Peds 40 milliGRAM(s) Oral daily  carvedilol 6.25 milliGRAM(s) Oral every 12 hours  cefTRIAXone   IVPB 1000 milliGRAM(s) IV Intermittent every 24 hours  chlorhexidine 4% Liquid 1 Application(s) Topical <User Schedule>  cholecalciferol 1000 Unit(s) Oral daily  clonazePAM  Tablet 0.25 milliGRAM(s) Oral two times a day  cyanocobalamin 1000 MICROGram(s) Oral daily  folic acid 2 milliGRAM(s) Oral daily  heparin   Injectable 5000 Unit(s) SubCutaneous every 12 hours  INCRUSE ELLIPTA 62.5MCG 1 Inhalation 1 Inhalation Inhalation daily  lamoTRIgine 200 milliGRAM(s) Oral two times a day  ondansetron Injectable 4 milliGRAM(s) IV Push once  paliperidone ER. 3 milliGRAM(s) Oral daily  pantoprazole    Tablet 40 milliGRAM(s) Oral before breakfast  predniSONE   Tablet 5 milliGRAM(s) Oral daily  sodium chloride 0.9%. 1000 milliLiter(s) (75 mL/Hr) IV Continuous <Continuous>  sodium phosphate IVPB 15 milliMole(s) IV Intermittent once  vancomycin    Solution 125 milliGRAM(s) Oral daily    MEDICATIONS  (PRN):  ALBUTerol    90 MICROgram(s) HFA Inhaler 2 Puff(s) Inhalation every 6 hours PRN Shortness of Breath and/or Wheezing  mometasone 50 MICROgram(s)/spray Nasal Spray 2 Spray(s) Nasal daily PRN prn for nasal congestion  simethicone 80 milliGRAM(s) Chew four times a day PRN Gas      ALLERGIES:  Allergies    Lasix (Blisters)    Intolerances        LABS:                        8.5    14.86 )-----------( 217      ( 26 Dec 2020 07:26 )             25.9     Hemoglobin: 8.5 g/dL (12-26 @ 07:26)  Hemoglobin: 8.5 g/dL (12-25 @ 08:15)  Hemoglobin: 10.2 g/dL (12-24 @ 08:29)  Hemoglobin: 10.6 g/dL (12-23 @ 12:15)    CBC Full  -  ( 26 Dec 2020 07:26 )  WBC Count : 14.86 K/uL  RBC Count : 2.59 M/uL  Hemoglobin : 8.5 g/dL  Hematocrit : 25.9 %  Platelet Count - Automated : 217 K/uL  Mean Cell Volume : 100.0 fL  Mean Cell Hemoglobin : 32.8 pg  Mean Cell Hemoglobin Concentration : 32.8 g/dL  Auto Neutrophil # : x  Auto Lymphocyte # : x  Auto Monocyte # : x  Auto Eosinophil # : x  Auto Basophil # : x  Auto Neutrophil % : x  Auto Lymphocyte % : x  Auto Monocyte % : x  Auto Eosinophil % : x  Auto Basophil % : x    12-26    127<L>  |  99  |  17  ----------------------------<  93  3.5   |  22  |  0.9    Ca    7.6<L>      26 Dec 2020 07:26  Phos  1.8     12-26  Mg     1.8     12-26      Creatinine Trend: 0.9<--, 0.8<--, 0.9<--, 0.8<--, 0.7<--, 1.0<--        hs Troponin:              CSF:                      EKG:   MICROBIOLOGY:    GI PCR Panel, Stool (collected 24 Dec 2020 21:41)  Source: .Stool Feces  Final Report (25 Dec 2020 18:46):    GI PCR Results: NOT detected    *******Please Note:*******    GI panel PCR evaluates for:    Campylobacter, Plesiomonas shigelloides, Salmonella,    Vibrio, Yersinia enterocolitica, Enteroaggregative    Escherichia coli (EAEC), Enteropathogenic E.coli (EPEC),    Enterotoxigenic E. coli (ETEC) lt/st, Shiga-like    toxin-producing E. coli (STEC) stx1/stx2,    Shigella/ Enteroinvasive E. coli (EIEC), Cryptosporidium,    Cyclospora cayetanensis, Entamoeba histolytica,    Giardia lamblia, Adenovirus F 40/41, Astrovirus,    Norovirus GI/GII, Rotavirus A, Sapovirus      IMAGING:      Labs, imaging, EKG personally reviewed    RADIOLOGY & ADDITIONAL TESTS: Reviewed.

## 2020-12-26 NOTE — PROGRESS NOTE ADULT - SUBJECTIVE AND OBJECTIVE BOX
[facility] FOLLOW UP NOTE  --------------------------------------------------------------------------------  Chief Complaint:    24 hour events/subjective:  Events over last 24hrs noted.  Pt states he feels well, good appetite        PAST HISTORY  --------------------------------------------------------------------------------  No significant changes to PMH, PSH, FHx, SHx, unless otherwise noted    ALLERGIES & MEDICATIONS  --------------------------------------------------------------------------------  Allergies    Lasix (Blisters)    Intolerances      Standing Inpatient Medications  aspirin  chewable 81 milliGRAM(s) Oral daily  atorvastatin Oral Tab/Cap - Peds 40 milliGRAM(s) Oral daily  carvedilol 6.25 milliGRAM(s) Oral every 12 hours  cefTRIAXone   IVPB 1000 milliGRAM(s) IV Intermittent every 24 hours  chlorhexidine 4% Liquid 1 Application(s) Topical <User Schedule>  cholecalciferol 1000 Unit(s) Oral daily  clonazePAM  Tablet 0.25 milliGRAM(s) Oral two times a day  cyanocobalamin 1000 MICROGram(s) Oral daily  folic acid 2 milliGRAM(s) Oral daily  heparin   Injectable 5000 Unit(s) SubCutaneous every 12 hours  INCRUSE ELLIPTA 62.5MCG 1 Inhalation 1 Inhalation Inhalation daily  lamoTRIgine 200 milliGRAM(s) Oral two times a day  ondansetron Injectable 4 milliGRAM(s) IV Push once  paliperidone ER. 3 milliGRAM(s) Oral daily  pantoprazole    Tablet 40 milliGRAM(s) Oral before breakfast  predniSONE   Tablet 5 milliGRAM(s) Oral daily  vancomycin    Solution 125 milliGRAM(s) Oral daily    PRN Inpatient Medications  ALBUTerol    90 MICROgram(s) HFA Inhaler 2 Puff(s) Inhalation every 6 hours PRN  mometasone 50 MICROgram(s)/spray Nasal Spray 2 Spray(s) Nasal daily PRN  simethicone 80 milliGRAM(s) Chew four times a day PRN      REVIEW OF SYSTEMS  --------------------------------------------------------------------------------  Gen: No weight changes, fatigue, fevers/chills, weakness  Skin: No rashes  Head/Eyes/Ears/Mouth: No headache; Normal hearing; Normal vision w/o blurriness; No sinus pain/discomfort, sore throat  Respiratory: No dyspnea, cough, wheezing, hemoptysis  CV: No chest pain, PND, orthopnea  GI: No abdominal pain, diarrhea, constipation, nausea, vomiting, melena, hematochezia  : No increased frequency, dysuria, hematuria, nocturia  MSK: No joint pain/swelling; no back pain; no edema  Neuro: No dizziness/lightheadedness, weakness, seizures, numbness, tingling  Heme: No easy bruising or bleeding  Endo: No heat/cold intolerance  Psych: No significant nervousness, anxiety, stress, depression    All other systems were reviewed and are negative, except as noted.    VITALS/PHYSICAL EXAM  --------------------------------------------------------------------------------  T(C): 35.6 (12-26-20 @ 13:53), Max: 36.4 (12-25-20 @ 22:21)  HR: 63 (12-26-20 @ 13:53) (60 - 63)  BP: 109/56 (12-26-20 @ 13:53) (109/56 - 117/59)  RR: 18 (12-26-20 @ 13:53) (18 - 18)  SpO2: --  Wt(kg): --        12-25-20 @ 07:01  -  12-26-20 @ 07:00  --------------------------------------------------------  IN: 1000 mL / OUT: 225 mL / NET: 775 mL      Physical Exam:  	Gen: NAD, well-appearing  	HEENT: PERRL, supple neck, clear oropharynx  	Pulm: CTA B/L  	CV: RRR, S1S2; no rub  	Back: No spinal or CVA tenderness; no sacral edema  	Abd: +BS, soft, nontender/nondistended  	: No suprapubic tenderness  	UE: Warm, FROM, no clubbing, intact strength; no edema; no asterixis  	LE: Warm, FROM, no clubbing, intact strength; no edema  	Neuro: No focal deficits, intact gait  	Psych: Normal affect and mood  	Skin: Warm, without rashes  	Vascular access:    LABS/STUDIES  --------------------------------------------------------------------------------              8.5    14.86 >-----------<  217      [12-26-20 @ 07:26]              25.9     127  |  99  |  17  ----------------------------<  93      [12-26-20 @ 07:26]  3.5   |  22  |  0.9        Ca     7.6     [12-26-20 @ 07:26]      Mg     1.8     [12-26-20 @ 07:26]      Phos  1.8     [12-26-20 @ 07:26]          Serum Osmolality 270      [12-25-20 @ 08:15]    Creatinine Trend:  SCr 0.9 [12-26 @ 07:26]  SCr 0.8 [12-25 @ 08:15]  SCr 0.9 [12-24 @ 15:54]  SCr 0.8 [12-24 @ 08:29]  SCr 0.7 [12-24 @ 03:20]    Urinalysis - [12-23-20 @ 12:15]      Color Yellow / Appearance Cloudy / SG 1.025 / pH 7.0      Gluc Negative / Ketone 40  / Bili Negative / Urobili 0.2       Blood Moderate / Protein 100 / Leuk Est Large / Nitrite Positive      RBC 11-25 / WBC >50 / Hyaline  / Gran  / Sq Epi  / Non Sq Epi Few / Bacteria TNTC      TSH 3.96      [12-24-20 @ 08:29]

## 2020-12-26 NOTE — PROGRESS NOTE ADULT - ASSESSMENT
1)  Mild asymptomatic hyponatremia which pt states is chronic.  Urine chemistries have yet to be sent and therefore cannot comment on pathophysiology of hyponatremia.  If truly chronic, pt may have a reset osmostat.    2)  Sepsis felt to be due to Klebsiella UTI, now clinically resolved    3)  Hypophosphatemia may be nutritional    Recommendations:    1)  Agree w/ IV Phos repletion as ordered    2)  Continue PO fluid restriction and increase dietary solute (salt and protein)    3)  Will again request urine osm and Na+

## 2020-12-27 LAB
ANION GAP SERPL CALC-SCNC: 12 MMOL/L — SIGNIFICANT CHANGE UP (ref 7–14)
BUN SERPL-MCNC: 14 MG/DL — SIGNIFICANT CHANGE UP (ref 10–20)
CALCIUM SERPL-MCNC: 7.8 MG/DL — LOW (ref 8.5–10.1)
CHLORIDE SERPL-SCNC: 101 MMOL/L — SIGNIFICANT CHANGE UP (ref 98–110)
CO2 SERPL-SCNC: 21 MMOL/L — SIGNIFICANT CHANGE UP (ref 17–32)
CREAT SERPL-MCNC: 0.8 MG/DL — SIGNIFICANT CHANGE UP (ref 0.7–1.5)
GLUCOSE SERPL-MCNC: 90 MG/DL — SIGNIFICANT CHANGE UP (ref 70–99)
HCT VFR BLD CALC: 30.8 % — LOW (ref 42–52)
HGB BLD-MCNC: 9.8 G/DL — LOW (ref 14–18)
MAGNESIUM SERPL-MCNC: 1.9 MG/DL — SIGNIFICANT CHANGE UP (ref 1.8–2.4)
MCHC RBC-ENTMCNC: 31.8 G/DL — LOW (ref 32–37)
MCHC RBC-ENTMCNC: 31.9 PG — HIGH (ref 27–31)
MCV RBC AUTO: 100.3 FL — HIGH (ref 80–94)
NRBC # BLD: 0 /100 WBCS — SIGNIFICANT CHANGE UP (ref 0–0)
OSMOLALITY UR: 384 MOS/KG — SIGNIFICANT CHANGE UP (ref 50–1200)
PHOSPHATE SERPL-MCNC: 2.6 MG/DL — SIGNIFICANT CHANGE UP (ref 2.1–4.9)
PLATELET # BLD AUTO: 245 K/UL — SIGNIFICANT CHANGE UP (ref 130–400)
POTASSIUM SERPL-MCNC: 3.7 MMOL/L — SIGNIFICANT CHANGE UP (ref 3.5–5)
POTASSIUM SERPL-SCNC: 3.7 MMOL/L — SIGNIFICANT CHANGE UP (ref 3.5–5)
RBC # BLD: 3.07 M/UL — LOW (ref 4.7–6.1)
RBC # FLD: 15.1 % — HIGH (ref 11.5–14.5)
SODIUM SERPL-SCNC: 134 MMOL/L — LOW (ref 135–146)
SODIUM UR-SCNC: 44 MMOL/L — SIGNIFICANT CHANGE UP
WBC # BLD: 14.34 K/UL — HIGH (ref 4.8–10.8)
WBC # FLD AUTO: 14.34 K/UL — HIGH (ref 4.8–10.8)

## 2020-12-27 PROCEDURE — 99233 SBSQ HOSP IP/OBS HIGH 50: CPT

## 2020-12-27 RX ADMIN — LAMOTRIGINE 200 MILLIGRAM(S): 25 TABLET, ORALLY DISINTEGRATING ORAL at 17:16

## 2020-12-27 RX ADMIN — HEPARIN SODIUM 5000 UNIT(S): 5000 INJECTION INTRAVENOUS; SUBCUTANEOUS at 05:15

## 2020-12-27 RX ADMIN — PANTOPRAZOLE SODIUM 40 MILLIGRAM(S): 20 TABLET, DELAYED RELEASE ORAL at 05:15

## 2020-12-27 RX ADMIN — HEPARIN SODIUM 5000 UNIT(S): 5000 INJECTION INTRAVENOUS; SUBCUTANEOUS at 17:19

## 2020-12-27 RX ADMIN — Medication 2 MILLIGRAM(S): at 11:05

## 2020-12-27 RX ADMIN — CARVEDILOL PHOSPHATE 6.25 MILLIGRAM(S): 80 CAPSULE, EXTENDED RELEASE ORAL at 05:15

## 2020-12-27 RX ADMIN — PREGABALIN 1000 MICROGRAM(S): 225 CAPSULE ORAL at 11:05

## 2020-12-27 RX ADMIN — Medication 81 MILLIGRAM(S): at 11:05

## 2020-12-27 RX ADMIN — Medication 0.25 MILLIGRAM(S): at 05:15

## 2020-12-27 RX ADMIN — Medication 1000 UNIT(S): at 11:05

## 2020-12-27 RX ADMIN — PALIPERIDONE 3 MILLIGRAM(S): 1.5 TABLET, EXTENDED RELEASE ORAL at 11:06

## 2020-12-27 RX ADMIN — Medication 0.25 MILLIGRAM(S): at 17:19

## 2020-12-27 RX ADMIN — Medication 5 MILLIGRAM(S): at 05:15

## 2020-12-27 RX ADMIN — Medication 125 MILLIGRAM(S): at 11:05

## 2020-12-27 RX ADMIN — LAMOTRIGINE 200 MILLIGRAM(S): 25 TABLET, ORALLY DISINTEGRATING ORAL at 05:15

## 2020-12-27 RX ADMIN — CEFTRIAXONE 100 MILLIGRAM(S): 500 INJECTION, POWDER, FOR SOLUTION INTRAMUSCULAR; INTRAVENOUS at 13:28

## 2020-12-27 RX ADMIN — ATORVASTATIN CALCIUM 40 MILLIGRAM(S): 80 TABLET, FILM COATED ORAL at 11:05

## 2020-12-27 NOTE — PROGRESS NOTE ADULT - ASSESSMENT
70M w/ PMH  CHF, COPD, CAD, epilepsy admitted for dysuria    IMPRESSION  #Sepsis on admission (T>101F, WBC>12) secondary to UTI/Cystitis  - CT Abdomen and Pelvis w/ IV Cont (12.23.20 @ 15:23):  Moderate diffuse colonic wall thickening, with associated pericolic inflammation, compatible with acute colitis. Small volume abdominopelvic ascites. Small left inguinal hernia containing a short segment of nonobstructed sigmoid colon. Small bilateral pleural effusions with bibasilar compressive atelectasis.  #  UTI- UCx grew Klebsiella   #  Acute colitis - Hx of C diff infection - currently not having diarrhea  #Abx allergy: Lasix (Blisters)      would recommend:    1. Continue Ceftriaxone   2. Continue  PO vancomycin 125 mg daily for prophylaxis        Attending Attestation:     45 minutes spent on total encounter; more than 50% of the visit was spent counseling and/or coordinating care by the attending physician.     70M w/ PMH  CHF, COPD, CAD, epilepsy admitted for dysuria    IMPRESSION  #Sepsis on admission (T>101F, WBC>12) secondary to UTI/Cystitis  - CT Abdomen and Pelvis w/ IV Cont (12.23.20 @ 15:23):  Moderate diffuse colonic wall thickening, with associated pericolic inflammation, compatible with acute colitis. Small volume abdominopelvic ascites. Small left inguinal hernia containing a short segment of nonobstructed sigmoid colon. Small bilateral pleural effusions with bibasilar compressive atelectasis.  #  UTI- UCx grew Klebsiella   #  Acute colitis - Hx of C diff infection - currently not having diarrhea  #Abx allergy: Lasix (Blisters)      would recommend:    1. Continue Ceftriaxone   2. Continue  PO vancomycin 125 mg daily for prophylaxis  3. Monitor WBC count     Attending Attestation:     45 minutes spent on total encounter; more than 50% of the visit was spent counseling and/or coordinating care by the attending physician.

## 2020-12-27 NOTE — PROGRESS NOTE ADULT - SUBJECTIVE AND OBJECTIVE BOX
Patient is seen and examined at the bed side, is afebrile. The Blood cultures are negative to date. And [facility] urine culture grew Klebsiella.       REVIEW OF SYSTEMS: All other review systems are negative      ALLERGIES: Lasix (Blisters)      Vital Signs Last 24 Hrs  T(C): 35.5 (27 Dec 2020 13:39), Max: 35.7 (26 Dec 2020 21:03)  T(F): 95.9 (27 Dec 2020 13:39), Max: 96.3 (26 Dec 2020 21:03)  HR: 61 (27 Dec 2020 13:39) (60 - 75)  BP: 99/56 (27 Dec 2020 13:39) (99/56 - 141/81)  BP(mean): --  RR: 18 (27 Dec 2020 13:39) (16 - 18)  SpO2: --      PHYSICAL EXAM:  GENERAL: Not in distress   CHEST/LUNG:  Not using accessory muscles   HEART: s1 and s2 present  ABDOMEN:  Nontender and  Nondistended  EXTREMITIES: No pedal  edema  CNS: Awake and Alert      LABS:                        9.8    14.34 )-----------( 245      ( 27 Dec 2020 07:40 )             30.8       12-27    134<L>  |  101  |  14  ----------------------------<  90  3.7   |  21  |  0.8    Ca    7.8<L>      27 Dec 2020 07:40  Phos  2.6     12-27  Mg     1.9     12-27        MEDICATIONS  (STANDING):  aspirin  chewable 81 milliGRAM(s) Oral daily  atorvastatin Oral Tab/Cap - Peds 40 milliGRAM(s) Oral daily  cefTRIAXone   IVPB 1000 milliGRAM(s) IV Intermittent every 24 hours  chlorhexidine 4% Liquid 1 Application(s) Topical <User Schedule>  cholecalciferol 1000 Unit(s) Oral daily  clonazePAM  Tablet 0.25 milliGRAM(s) Oral two times a day  cyanocobalamin 1000 MICROGram(s) Oral daily  folic acid 2 milliGRAM(s) Oral daily  heparin   Injectable 5000 Unit(s) SubCutaneous every 12 hours  INCRUSE ELLIPTA 62.5MCG 1 Inhalation 1 Inhalation Inhalation daily  lamoTRIgine 200 milliGRAM(s) Oral two times a day  ondansetron Injectable 4 milliGRAM(s) IV Push once  paliperidone ER. 3 milliGRAM(s) Oral daily  pantoprazole    Tablet 40 milliGRAM(s) Oral before breakfast  predniSONE   Tablet 5 milliGRAM(s) Oral daily  vancomycin    Solution 125 milliGRAM(s) Oral daily    MEDICATIONS  (PRN):  ALBUTerol    90 MICROgram(s) HFA Inhaler 2 Puff(s) Inhalation every 6 hours PRN Shortness of Breath and/or Wheezing  mometasone 50 MICROgram(s)/spray Nasal Spray 2 Spray(s) Nasal daily PRN prn for nasal congestion  simethicone 80 milliGRAM(s) Chew four times a day PRN Gas        RADIOLOGY & ADDITIONAL TESTS:    < from: CT Abdomen and Pelvis w/ IV Cont (12.23.20 @ 15:23) >  1. Moderate diffuse colonic wall thickening, with associated pericolic inflammation, compatible with acute colitis.    2. Small volume abdominopelvic ascites.    3. Small left inguinal hernia containing a short segment of nonobstructed sigmoid colon.    4. Small bilateral pleural effusions with bibasilar compressive atelectasis.      < from: Xray Chest 1 View-PORTABLE IMMEDIATE (12.23.20 @ 12:57) >  Bilateral prominent interstitial markings. No definite focal consolidation, effusion, or pneumothorax.        MICROBIOLOGY DATA:    GI PCR Panel, Stool (12.24.20 @ 21:41)   Specimen Source: .Stool Feces   Culture Results:   GI PCR Results: NOT detected     Culture - Urine (12.23.20 @ 12:20)   - Amikacin: S <=16   - Amoxicillin/Clavulanic Acid: S <=8/4   - Ampicillin: R 16 These ampicillin results predict results for amoxicillin   - Ampicillin/Sulbactam: S <=4/2 Enterobacter, Citrobacter, and Serratia may develop resistance during prolonged therapy (3-4 days)   - Aztreonam: S <=4   - Cefazolin: S <=2 (MIC_CL_COM_ENTERIC_CEFAZU) For uncomplicated UTI with K. pneumoniae, E. coli, or P. mirablis: PREMA <=16 is sensitive and PREMA >=32 is resistant. This also predicts results for oral agents cefaclor, cefdinir, cefpodoxime, cefprozil, cefuroxime axetil, cephalexin and locarbef for uncomplicated UTI. Note that some isolates may be susceptible to these agents while testing resistant to cefazolin.   - Cefepime: S <=2   - Cefoxitin: S <=8   - Ceftriaxone: S <=1 Enterobacter, Citrobacter, and Serratia may develop resistance during prolonged therapy   - Ciprofloxacin: S <=0.25   - Ertapenem: S <=0.5   - Gentamicin: S <=2   - Imipenem: S <=1   - Levofloxacin: S <=0.5   - Meropenem: S <=1   - Nitrofurantoin: S <=32 Should not be used to treat pyelonephritis   - Piperacillin/Tazobactam: S <=8   - Tigecycline: S <=2   - Tobramycin: S <=2   - Trimethoprim/Sulfamethoxazole: S <=0.5/9.5   Specimen Source: .Urine Clean Catch (Midstream)   Culture Results:  >100,000 CFU/ml Klebsiella pneumoniae     Culture - Blood (12.23.20 @ 12:15)   Specimen Source: .Blood Blood-Peripheral   Culture Results: No growth to date.     Culture - Blood (12.23.20 @ 12:15)   Specimen Source: .Blood Blood-Peripheral   Culture Results: No growth to date.     Respiratory Viral Panel with COVID-19 by CARLOTTA (12.23.20 @ 11:50)   Rapid RVP Result: Jonnateelver   SARS-CoV-2: NotDetec:           Patient is seen and examined at the bed side, is afebrile. The Blood cultures are negative to date. And the urine culture grew Klebsiella.       REVIEW OF SYSTEMS: All other review systems are negative      ALLERGIES: Lasix (Blisters)      Vital Signs Last 24 Hrs  T(C): 35.5 (27 Dec 2020 13:39), Max: 35.7 (26 Dec 2020 21:03)  T(F): 95.9 (27 Dec 2020 13:39), Max: 96.3 (26 Dec 2020 21:03)  HR: 61 (27 Dec 2020 13:39) (60 - 75)  BP: 99/56 (27 Dec 2020 13:39) (99/56 - 141/81)  BP(mean): --  RR: 18 (27 Dec 2020 13:39) (16 - 18)  SpO2: --      PHYSICAL EXAM:  GENERAL: Not in distress   CHEST/LUNG:  Not using accessory muscles   HEART: s1 and s2 present  ABDOMEN:  Nontender and  Nondistended  EXTREMITIES: No pedal  edema  CNS: Awake and Alert      LABS:                        9.8    14.34 )-----------( 245      ( 27 Dec 2020 07:40 )             30.8       12-27    134<L>  |  101  |  14  ----------------------------<  90  3.7   |  21  |  0.8    Ca    7.8<L>      27 Dec 2020 07:40  Phos  2.6     12-27  Mg     1.9     12-27        MEDICATIONS  (STANDING):  aspirin  chewable 81 milliGRAM(s) Oral daily  atorvastatin Oral Tab/Cap - Peds 40 milliGRAM(s) Oral daily  cefTRIAXone   IVPB 1000 milliGRAM(s) IV Intermittent every 24 hours  chlorhexidine 4% Liquid 1 Application(s) Topical <User Schedule>  cholecalciferol 1000 Unit(s) Oral daily  clonazePAM  Tablet 0.25 milliGRAM(s) Oral two times a day  cyanocobalamin 1000 MICROGram(s) Oral daily  folic acid 2 milliGRAM(s) Oral daily  heparin   Injectable 5000 Unit(s) SubCutaneous every 12 hours  INCRUSE ELLIPTA 62.5MCG 1 Inhalation 1 Inhalation Inhalation daily  lamoTRIgine 200 milliGRAM(s) Oral two times a day  ondansetron Injectable 4 milliGRAM(s) IV Push once  paliperidone ER. 3 milliGRAM(s) Oral daily  pantoprazole    Tablet 40 milliGRAM(s) Oral before breakfast  predniSONE   Tablet 5 milliGRAM(s) Oral daily  vancomycin    Solution 125 milliGRAM(s) Oral daily    MEDICATIONS  (PRN):  ALBUTerol    90 MICROgram(s) HFA Inhaler 2 Puff(s) Inhalation every 6 hours PRN Shortness of Breath and/or Wheezing  mometasone 50 MICROgram(s)/spray Nasal Spray 2 Spray(s) Nasal daily PRN prn for nasal congestion  simethicone 80 milliGRAM(s) Chew four times a day PRN Gas        RADIOLOGY & ADDITIONAL TESTS:    < from: CT Abdomen and Pelvis w/ IV Cont (12.23.20 @ 15:23) >  1. Moderate diffuse colonic wall thickening, with associated pericolic inflammation, compatible with acute colitis.    2. Small volume abdominopelvic ascites.    3. Small left inguinal hernia containing a short segment of nonobstructed sigmoid colon.    4. Small bilateral pleural effusions with bibasilar compressive atelectasis.      < from: Xray Chest 1 View-PORTABLE IMMEDIATE (12.23.20 @ 12:57) >  Bilateral prominent interstitial markings. No definite focal consolidation, effusion, or pneumothorax.        MICROBIOLOGY DATA:    GI PCR Panel, Stool (12.24.20 @ 21:41)   Specimen Source: .Stool Feces   Culture Results:   GI PCR Results: NOT detected     Culture - Urine (12.23.20 @ 12:20)   - Amikacin: S <=16   - Amoxicillin/Clavulanic Acid: S <=8/4   - Ampicillin: R 16 These ampicillin results predict results for amoxicillin   - Ampicillin/Sulbactam: S <=4/2 Enterobacter, Citrobacter, and Serratia may develop resistance during prolonged therapy (3-4 days)   - Aztreonam: S <=4   - Cefazolin: S <=2 (MIC_CL_COM_ENTERIC_CEFAZU) For uncomplicated UTI with K. pneumoniae, E. coli, or P. mirablis: PREMA <=16 is sensitive and PREMA >=32 is resistant. This also predicts results for oral agents cefaclor, cefdinir, cefpodoxime, cefprozil, cefuroxime axetil, cephalexin and locarbef for uncomplicated UTI. Note that some isolates may be susceptible to these agents while testing resistant to cefazolin.   - Cefepime: S <=2   - Cefoxitin: S <=8   - Ceftriaxone: S <=1 Enterobacter, Citrobacter, and Serratia may develop resistance during prolonged therapy   - Ciprofloxacin: S <=0.25   - Ertapenem: S <=0.5   - Gentamicin: S <=2   - Imipenem: S <=1   - Levofloxacin: S <=0.5   - Meropenem: S <=1   - Nitrofurantoin: S <=32 Should not be used to treat pyelonephritis   - Piperacillin/Tazobactam: S <=8   - Tigecycline: S <=2   - Tobramycin: S <=2   - Trimethoprim/Sulfamethoxazole: S <=0.5/9.5   Specimen Source: .Urine Clean Catch (Midstream)   Culture Results:  >100,000 CFU/ml Klebsiella pneumoniae     Culture - Blood (12.23.20 @ 12:15)   Specimen Source: .Blood Blood-Peripheral   Culture Results: No growth to date.     Culture - Blood (12.23.20 @ 12:15)   Specimen Source: .Blood Blood-Peripheral   Culture Results: No growth to date.     Respiratory Viral Panel with COVID-19 by CARLOTTA (12.23.20 @ 11:50)   Rapid RVP Result: Jonnateelver   SARS-CoV-2: NotDetec:

## 2020-12-27 NOTE — PROGRESS NOTE ADULT - ASSESSMENT
70M PMHx HFpEF suspected, COPD, CAD, seizure disorder here with severe sepsis, present on admission, presumed due to uti.    #Severe sepsis, presumed due to kleb uti  resolved  ucx kleb pan sensitive, bcx ntd  ceftriaxone  vanco po 125 for cdiff ppx  appreciate id  #Hyponatremia, consistent with hypovolemia  near resolved, off ivf  d/w wife, cont invega 3  1L fluid restrict  hold salt tab per wife request.   #COPD  proventil prn  #Psychosis  invega 3   #CAD  lipitor 40  asa  #Seizure disorder  lamictal 200 bid  #HFpEF, suspected; chronic  lasix on hold  cxr noted  #Bullous pemphigoid  prednisone 5  mtx 2.5 qweek  #DVT ppx  subq hep    #Progress Note Handoff:  Pending (specify):  Consults_________, Tests________, Test Results_______, Other___bmp______  Family discussion:d/w pt at bedside re: treatment plan, primary dx  Disposition: Home___/SNF___/Other________/Unknown at this time___x_____

## 2020-12-27 NOTE — PROGRESS NOTE ADULT - SUBJECTIVE AND OBJECTIVE BOX
INTERVAL HPI/OVERNIGHT EVENTS:    SUBJECTIVE: Patient seen and examined at bedside.     no cp, sob, abd pain, fever  no ha, dizziness, nausea, vomiting, syncope, lightheadedness    OBJECTIVE:    VITAL SIGNS:  Vital Signs Last 24 Hrs  T(C): 35.6 (27 Dec 2020 05:49), Max: 35.7 (26 Dec 2020 21:03)  T(F): 96 (27 Dec 2020 05:49), Max: 96.3 (26 Dec 2020 21:03)  HR: 75 (27 Dec 2020 05:49) (60 - 75)  BP: 141/81 (27 Dec 2020 05:49) (106/58 - 141/81)  BP(mean): --  RR: 16 (27 Dec 2020 05:49) (16 - 18)  SpO2: --      PHYSICAL EXAM:    General: NAD  HEENT: NC/AT; PERRL, clear conjunctiva  Neck: supple  Respiratory: CTA b/l  Cardiovascular: +S1/S2; RRR  Abdomen: soft, NT/ND; +BS x4  Extremities: WWP, 2+ peripheral pulses b/l; no LE edema  Skin: normal color and turgor; no rash  Neurological:    MEDICATIONS:  MEDICATIONS  (STANDING):  aspirin  chewable 81 milliGRAM(s) Oral daily  atorvastatin Oral Tab/Cap - Peds 40 milliGRAM(s) Oral daily  carvedilol 6.25 milliGRAM(s) Oral every 12 hours  cefTRIAXone   IVPB 1000 milliGRAM(s) IV Intermittent every 24 hours  chlorhexidine 4% Liquid 1 Application(s) Topical <User Schedule>  cholecalciferol 1000 Unit(s) Oral daily  clonazePAM  Tablet 0.25 milliGRAM(s) Oral two times a day  cyanocobalamin 1000 MICROGram(s) Oral daily  folic acid 2 milliGRAM(s) Oral daily  heparin   Injectable 5000 Unit(s) SubCutaneous every 12 hours  INCRUSE ELLIPTA 62.5MCG 1 Inhalation 1 Inhalation Inhalation daily  lamoTRIgine 200 milliGRAM(s) Oral two times a day  ondansetron Injectable 4 milliGRAM(s) IV Push once  paliperidone ER. 3 milliGRAM(s) Oral daily  pantoprazole    Tablet 40 milliGRAM(s) Oral before breakfast  predniSONE   Tablet 5 milliGRAM(s) Oral daily  vancomycin    Solution 125 milliGRAM(s) Oral daily    MEDICATIONS  (PRN):  ALBUTerol    90 MICROgram(s) HFA Inhaler 2 Puff(s) Inhalation every 6 hours PRN Shortness of Breath and/or Wheezing  mometasone 50 MICROgram(s)/spray Nasal Spray 2 Spray(s) Nasal daily PRN prn for nasal congestion  simethicone 80 milliGRAM(s) Chew four times a day PRN Gas      ALLERGIES:  Allergies    Lasix (Blisters)    Intolerances        LABS:                        9.8    14.34 )-----------( 245      ( 27 Dec 2020 07:40 )             30.8     Hemoglobin: 9.8 g/dL (12-27 @ 07:40)  Hemoglobin: 8.5 g/dL (12-26 @ 07:26)  Hemoglobin: 8.5 g/dL (12-25 @ 08:15)  Hemoglobin: 10.2 g/dL (12-24 @ 08:29)  Hemoglobin: 10.6 g/dL (12-23 @ 12:15)    CBC Full  -  ( 27 Dec 2020 07:40 )  WBC Count : 14.34 K/uL  RBC Count : 3.07 M/uL  Hemoglobin : 9.8 g/dL  Hematocrit : 30.8 %  Platelet Count - Automated : 245 K/uL  Mean Cell Volume : 100.3 fL  Mean Cell Hemoglobin : 31.9 pg  Mean Cell Hemoglobin Concentration : 31.8 g/dL  Auto Neutrophil # : x  Auto Lymphocyte # : x  Auto Monocyte # : x  Auto Eosinophil # : x  Auto Basophil # : x  Auto Neutrophil % : x  Auto Lymphocyte % : x  Auto Monocyte % : x  Auto Eosinophil % : x  Auto Basophil % : x    12-27    134<L>  |  101  |  14  ----------------------------<  90  3.7   |  21  |  0.8    Ca    7.8<L>      27 Dec 2020 07:40  Phos  2.6     12-27  Mg     1.9     12-27      Creatinine Trend: 0.8<--, 0.9<--, 0.8<--, 0.9<--, 0.8<--, 0.7<--        hs Troponin:              CSF:                      EKG:   MICROBIOLOGY:    GI PCR Panel, Stool (collected 24 Dec 2020 21:41)  Source: .Stool Feces  Final Report (25 Dec 2020 18:46):    GI PCR Results: NOT detected    *******Please Note:*******    GI panel PCR evaluates for:    Campylobacter, Plesiomonas shigelloides, Salmonella,    Vibrio, Yersinia enterocolitica, Enteroaggregative    Escherichia coli (EAEC), Enteropathogenic E.coli (EPEC),    Enterotoxigenic E. coli (ETEC) lt/st, Shiga-like    toxin-producing E. coli (STEC) stx1/stx2,    Shigella/ Enteroinvasive E. coli (EIEC), Cryptosporidium,    Cyclospora cayetanensis, Entamoeba histolytica,    Giardia lamblia, Adenovirus F 40/41, Astrovirus,    Norovirus GI/GII, Rotavirus A, Sapovirus      IMAGING:      Labs, imaging, EKG personally reviewed    RADIOLOGY & ADDITIONAL TESTS: Reviewed.

## 2020-12-28 LAB
ANION GAP SERPL CALC-SCNC: 7 MMOL/L — SIGNIFICANT CHANGE UP (ref 7–14)
BUN SERPL-MCNC: 16 MG/DL — SIGNIFICANT CHANGE UP (ref 10–20)
CALCIUM SERPL-MCNC: 8 MG/DL — LOW (ref 8.5–10.1)
CHLORIDE SERPL-SCNC: 101 MMOL/L — SIGNIFICANT CHANGE UP (ref 98–110)
CO2 SERPL-SCNC: 26 MMOL/L — SIGNIFICANT CHANGE UP (ref 17–32)
CREAT SERPL-MCNC: 0.7 MG/DL — SIGNIFICANT CHANGE UP (ref 0.7–1.5)
GLUCOSE SERPL-MCNC: 88 MG/DL — SIGNIFICANT CHANGE UP (ref 70–99)
HCT VFR BLD CALC: 29.8 % — LOW (ref 42–52)
HGB BLD-MCNC: 9.6 G/DL — LOW (ref 14–18)
MAGNESIUM SERPL-MCNC: 1.9 MG/DL — SIGNIFICANT CHANGE UP (ref 1.8–2.4)
MCHC RBC-ENTMCNC: 32.2 G/DL — SIGNIFICANT CHANGE UP (ref 32–37)
MCHC RBC-ENTMCNC: 32.7 PG — HIGH (ref 27–31)
MCV RBC AUTO: 101.4 FL — HIGH (ref 80–94)
NRBC # BLD: 0 /100 WBCS — SIGNIFICANT CHANGE UP (ref 0–0)
PHOSPHATE SERPL-MCNC: 2.8 MG/DL — SIGNIFICANT CHANGE UP (ref 2.1–4.9)
PLATELET # BLD AUTO: 250 K/UL — SIGNIFICANT CHANGE UP (ref 130–400)
POTASSIUM SERPL-MCNC: 4.3 MMOL/L — SIGNIFICANT CHANGE UP (ref 3.5–5)
POTASSIUM SERPL-SCNC: 4.3 MMOL/L — SIGNIFICANT CHANGE UP (ref 3.5–5)
RBC # BLD: 2.94 M/UL — LOW (ref 4.7–6.1)
RBC # FLD: 15.3 % — HIGH (ref 11.5–14.5)
SODIUM SERPL-SCNC: 134 MMOL/L — LOW (ref 135–146)
WBC # BLD: 11.73 K/UL — HIGH (ref 4.8–10.8)
WBC # FLD AUTO: 11.73 K/UL — HIGH (ref 4.8–10.8)

## 2020-12-28 PROCEDURE — 99233 SBSQ HOSP IP/OBS HIGH 50: CPT

## 2020-12-28 RX ORDER — METHOTREXATE 2.5 MG/1
12.5 TABLET ORAL
Refills: 0 | Status: DISCONTINUED | OUTPATIENT
Start: 2020-12-28 | End: 2020-12-29

## 2020-12-28 RX ADMIN — Medication 5 MILLIGRAM(S): at 06:17

## 2020-12-28 RX ADMIN — HEPARIN SODIUM 5000 UNIT(S): 5000 INJECTION INTRAVENOUS; SUBCUTANEOUS at 18:00

## 2020-12-28 RX ADMIN — ATORVASTATIN CALCIUM 40 MILLIGRAM(S): 80 TABLET, FILM COATED ORAL at 13:37

## 2020-12-28 RX ADMIN — PANTOPRAZOLE SODIUM 40 MILLIGRAM(S): 20 TABLET, DELAYED RELEASE ORAL at 06:17

## 2020-12-28 RX ADMIN — Medication 0.25 MILLIGRAM(S): at 17:57

## 2020-12-28 RX ADMIN — Medication 81 MILLIGRAM(S): at 13:37

## 2020-12-28 RX ADMIN — LAMOTRIGINE 200 MILLIGRAM(S): 25 TABLET, ORALLY DISINTEGRATING ORAL at 06:17

## 2020-12-28 RX ADMIN — PALIPERIDONE 3 MILLIGRAM(S): 1.5 TABLET, EXTENDED RELEASE ORAL at 13:38

## 2020-12-28 RX ADMIN — LAMOTRIGINE 200 MILLIGRAM(S): 25 TABLET, ORALLY DISINTEGRATING ORAL at 17:57

## 2020-12-28 RX ADMIN — Medication 1000 UNIT(S): at 13:40

## 2020-12-28 RX ADMIN — Medication 125 MILLIGRAM(S): at 13:39

## 2020-12-28 RX ADMIN — CEFTRIAXONE 100 MILLIGRAM(S): 500 INJECTION, POWDER, FOR SOLUTION INTRAMUSCULAR; INTRAVENOUS at 13:43

## 2020-12-28 RX ADMIN — PREGABALIN 1000 MICROGRAM(S): 225 CAPSULE ORAL at 13:39

## 2020-12-28 RX ADMIN — Medication 2 MILLIGRAM(S): at 13:37

## 2020-12-28 RX ADMIN — HEPARIN SODIUM 5000 UNIT(S): 5000 INJECTION INTRAVENOUS; SUBCUTANEOUS at 06:19

## 2020-12-28 RX ADMIN — Medication 0.25 MILLIGRAM(S): at 06:17

## 2020-12-28 RX ADMIN — METHOTREXATE 12.5 MILLIGRAM(S): 2.5 TABLET ORAL at 17:57

## 2020-12-28 NOTE — PROGRESS NOTE ADULT - ASSESSMENT
70M w/ PMH  CHF, COPD, CAD, epilepsy admitted for dysuria    IMPRESSION  #Sepsis on admission (T>101F, WBC>12) secondary to UTI/Cystitis  - CT Abdomen and Pelvis w/ IV Cont (12.23.20 @ 15:23):  Moderate diffuse colonic wall thickening, with associated pericolic inflammation, compatible with acute colitis. Small volume abdominopelvic ascites. Small left inguinal hernia containing a short segment of nonobstructed sigmoid colon. Small bilateral pleural effusions with bibasilar compressive atelectasis.  #  UTI- UCx grew Klebsiella   #  Acute colitis - Hx of C diff infection - currently not having diarrhea  #Abx allergy: Lasix (Blisters)      Recommendations  - continue ceftriaxone, can plan for 7 day course (end date 12/30);if planned for discarge can finish with PO bactrim 1DS BID   - Continue  PO vancomycin 125 mg daily for prophylaxis, can end 5 days after completing antibiotics    Please call or message on [employer] Teams if with any questions.  Spectra [phone]

## 2020-12-28 NOTE — PROGRESS NOTE ADULT - ASSESSMENT
70M PMHx HFpEF suspected, COPD, CAD, seizure disorder here with severe sepsis, present on admission, presumed due to uti.    #Severe sepsis, presumed due to kleb uti  resolved  ucx kleb pan sensitive, bcx ntd  ceftriaxone  vanco po 125 for cdiff ppx  appreciate id  #Hyponatremia, consistent with hypovolemia  near resolved, off ivf  d/w wife, cont invega 3  1L fluid restrict  hold salt tab per wife request.   #COPD  proventil prn  #Psychosis  invega 3   #CAD  lipitor 40  asa  #Seizure disorder  lamictal 200 bid  #HFpEF, suspected; chronic  lasix on hold  cxr noted  #Bullous pemphigoid  prednisone 5  mtx 2.5 qweek  #DVT ppx  subq hep    #Progress Note Handoff:  Pending (specify):  Consults_________, Tests________, Test Results_______, Other___d/c planning______  Family discussion:d/w pt at bedside re: treatment plan, primary dx  Disposition: Home___/SNF___/Other________/Unknown at this time___x_____

## 2020-12-28 NOTE — PROGRESS NOTE ADULT - SUBJECTIVE AND OBJECTIVE BOX
ABIDA JONES  70y, Male  Allergy: Lasix (Blisters)      LOS  5d    CHIEF COMPLAINT: UTI sepsis (28 Dec 2020 13:39)      INTERVAL EVENTS/HPI  - No acute events overnight  - T(F): , Max: 96.3 (12-27-20 @ 20:37)  - Denies any worsening symptoms  - Tolerating medication  - WBC Count: 11.73 (12-28-20 @ 07:48)  WBC Count: 14.34 (12-27-20 @ 07:40)     - Creatinine, Serum: 0.7 (12-28-20 @ 07:48)  Creatinine, Serum: 0.8 (12-27-20 @ 07:40)       ROS  General: Denies rigors, nightsweats  HEENT: Denies headache, rhinorrhea, sore throat, eye pain  CV: Denies CP, palpitations  PULM: Denies wheezing, hemoptysis  GI: Denies hematemesis, hematochezia, melena  : Denies discharge, hematuria  MSK: Denies arthralgias, myalgias  SKIN: Denies rash, lesions  NEURO: Denies paresthesias, weakness  PSYCH: Denies depression, anxiety    VITALS:  T(F): 96.1, Max: 96.3 (12-27-20 @ 20:37)  HR: 63  BP: 131/65  RR: 18Vital Signs Last 24 Hrs  T(C): 35.6 (28 Dec 2020 05:27), Max: 35.7 (27 Dec 2020 20:37)  T(F): 96.1 (28 Dec 2020 05:27), Max: 96.3 (27 Dec 2020 20:37)  HR: 63 (28 Dec 2020 05:27) (60 - 63)  BP: 131/65 (28 Dec 2020 05:27) (113/58 - 131/65)  BP(mean): --  RR: 18 (28 Dec 2020 05:27) (16 - 18)  SpO2: --    PHYSICAL EXAM:  Gen: NAD, resting in bed  HEENT: Normocephalic, atraumatic  Neck: supple, no lymphadenopathy  CV: Regular rate & regular rhythm  Lungs: decreased BS at bases, no fremitus  Abdomen: Soft, BS present  Ext: Warm, well perfused  Neuro: non focal, awake  Skin: no rash, no erythema  Lines: no phlebitis    FH: Non-contributory  Social Hx: Non-contributory    TESTS & MEASUREMENTS:                        9.6    11.73 )-----------( 250      ( 28 Dec 2020 07:48 )             29.8     12-28    134<L>  |  101  |  16  ----------------------------<  88  4.3   |  26  |  0.7    Ca    8.0<L>      28 Dec 2020 07:48  Phos  2.8     12-28  Mg     1.9     12-28      eGFR if Non African American: 94 mL/min/1.73M2 (12-28-20 @ 07:48)  eGFR if : 109 mL/min/1.73M2 (12-28-20 @ 07:48)          GI PCR Panel, Stool (collected 12-24-20 @ 21:41)  Source: .Stool Feces  Final Report (12-25-20 @ 18:46):    GI PCR Results: NOT detected    *******Please Note:*******    GI panel PCR evaluates for:    Campylobacter, Plesiomonas shigelloides, Salmonella,    Vibrio, Yersinia enterocolitica, Enteroaggregative    Escherichia coli (EAEC), Enteropathogenic E.coli (EPEC),    Enterotoxigenic E. coli (ETEC) lt/st, Shiga-like    toxin-producing E. coli (STEC) stx1/stx2,    Shigella/ Enteroinvasive E. coli (EIEC), Cryptosporidium,    Cyclospora cayetanensis, Entamoeba histolytica,    Giardia lamblia, Adenovirus F 40/41, Astrovirus,    Norovirus GI/GII, Rotavirus A, Sapovirus    Culture - Urine (collected 12-23-20 @ 12:20)  Source: .Urine Clean Catch (Midstream)  Final Report (12-26-20 @ 09:26):    >100,000 CFU/ml Klebsiella pneumoniae  Organism: Klebsiella pneumoniae (12-26-20 @ 09:26)  Organism: Klebsiella pneumoniae (12-26-20 @ 09:26)      -  Amikacin: S <=16      -  Amoxicillin/Clavulanic Acid: S <=8/4      -  Ampicillin: R 16 These ampicillin results predict results for amoxicillin      -  Ampicillin/Sulbactam: S <=4/2 Enterobacter, Citrobacter, and Serratia may develop resistance during prolonged therapy (3-4 days)      -  Aztreonam: S <=4      -  Cefazolin: S <=2 (MIC_CL_COM_ENTERIC_CEFAZU) For uncomplicated UTI with K. pneumoniae, E. coli, or P. mirablis: PREMA <=16 is sensitive and PREMA >=32 is resistant. This also predicts results for oral agents cefaclor, cefdinir, cefpodoxime, cefprozil, cefuroxime axetil, cephalexin and locarbef for uncomplicated UTI. Note that some isolates may be susceptible to these agents while testing resistant to cefazolin.      -  Cefepime: S <=2      -  Cefoxitin: S <=8      -  Ceftriaxone: S <=1 Enterobacter, Citrobacter, and Serratia may develop resistance during prolonged therapy      -  Ciprofloxacin: S <=0.25      -  Ertapenem: S <=0.5      -  Gentamicin: S <=2      -  Imipenem: S <=1      -  Levofloxacin: S <=0.5      -  Meropenem: S <=1      -  Nitrofurantoin: S <=32 Should not be used to treat pyelonephritis      -  Piperacillin/Tazobactam: S <=8      -  Tigecycline: S <=2      -  Tobramycin: S <=2      -  Trimethoprim/Sulfamethoxazole: S <=0.5/9.5      Method Type: PREMA    Culture - Blood (collected 12-23-20 @ 12:15)  Source: .Blood Blood-Peripheral  Preliminary Report (12-25-20 @ 02:19):    No growth to date.    Culture - Blood (collected 12-23-20 @ 12:15)  Source: .Blood Blood-Peripheral  Preliminary Report (12-25-20 @ 02:19):    No growth to date.            INFECTIOUS DISEASES TESTING  Rapid RVP Result: NotDetec (12-23-20 @ 11:50)      INFLAMMATORY MARKERS      RADIOLOGY & ADDITIONAL TESTS:  I have personally reviewed the last available Chest xray  CXR      CT      CARDIOLOGY TESTING  12 Lead ECG:   Ventricular Rate 63 BPM    Atrial Rate 63 BPM    P-R Interval 194 ms    QRS Duration 122 ms    Q-T Interval 430 ms    QTC Calculation(Bazett) 440 ms    P Axis 76 degrees    R Axis 265 degrees    T Axis 127 degrees    Diagnosis Line Atrial-paced rhythm  Anterolateral infarct , age undetermined  Abnormal ECG    Reconfirmed by MARIANELA JEONG MD ([id number]) on 12/24/2020 12:17:41 PM (12-23-20 @ 11:53)      MEDICATIONS  aspirin  chewable 81 Oral daily  atorvastatin Oral Tab/Cap - Peds 40 Oral daily  cefTRIAXone   IVPB 1000 IV Intermittent every 24 hours  chlorhexidine 4% Liquid 1 Topical <User Schedule>  cholecalciferol 1000 Oral daily  clonazePAM  Tablet 0.25 Oral two times a day  cyanocobalamin 1000 Oral daily  folic acid 2 Oral daily  heparin   Injectable 5000 SubCutaneous every 12 hours  INCRUSE ELLIPTA 62.5MCG 1 Inhalation 1 Inhalation daily  lamoTRIgine 200 Oral two times a day  methotrexate 2.5 Oral <User Schedule>  ondansetron Injectable 4 IV Push once  paliperidone ER. 3 Oral daily  pantoprazole    Tablet 40 Oral before breakfast  predniSONE   Tablet 5 Oral daily  vancomycin    Solution 125 Oral daily      WEIGHT  Weight (kg): 63.1 (12-23-20 @ 19:25)  Creatinine, Serum: 0.7 mg/dL (12-28-20 @ 07:48)      ANTIBIOTICS:  cefTRIAXone   IVPB 1000 milliGRAM(s) IV Intermittent every 24 hours  vancomycin    Solution 125 milliGRAM(s) Oral daily      All available historical records have been reviewed

## 2020-12-28 NOTE — CHART NOTE - NSCHARTNOTEFT_GEN_A_CORE
CATHY planning.  Per Dr. Serna request rapid covid swab was done at 1120 AM.  Approved by Dr. Ruma Obando
Sodium level still 116. Patient seen just before repeat blood was drawn and he is fully awake and oriented talking to me about his sodium level of 116 and other issues. At this time will try a sodium chloride 1 gram tab and start mild fluid restriction
Discussed with RN, will order telemetry pending repeat sodium levels

## 2020-12-28 NOTE — PROGRESS NOTE ADULT - SUBJECTIVE AND OBJECTIVE BOX
Nephrology Progress Note    ABIDA JONES  MRN-[id number]  70y  Male    S:  Patient is seen and examined, events over the last 24h noted.    O:  Allergies:  Lasix (Blisters)    Hospital Medications:   MaleMEDICATIONS  (STANDING):  aspirin  chewable 81 milliGRAM(s) Oral daily  atorvastatin Oral Tab/Cap - Peds 40 milliGRAM(s) Oral daily  cefTRIAXone   IVPB 1000 milliGRAM(s) IV Intermittent every 24 hours  chlorhexidine 4% Liquid 1 Application(s) Topical <User Schedule>  cholecalciferol 1000 Unit(s) Oral daily  clonazePAM  Tablet 0.25 milliGRAM(s) Oral two times a day  cyanocobalamin 1000 MICROGram(s) Oral daily  folic acid 2 milliGRAM(s) Oral daily  heparin   Injectable 5000 Unit(s) SubCutaneous every 12 hours  INCRUSE ELLIPTA 62.5MCG 1 Inhalation 1 Inhalation Inhalation daily  lamoTRIgine 200 milliGRAM(s) Oral two times a day  methotrexate 2.5 milliGRAM(s) Oral <User Schedule>  ondansetron Injectable 4 milliGRAM(s) IV Push once  paliperidone ER. 3 milliGRAM(s) Oral daily  pantoprazole    Tablet 40 milliGRAM(s) Oral before breakfast  predniSONE   Tablet 5 milliGRAM(s) Oral daily  vancomycin    Solution 125 milliGRAM(s) Oral daily    MEDICATIONS  (PRN):  ALBUTerol    90 MICROgram(s) HFA Inhaler 2 Puff(s) Inhalation every 6 hours PRN Shortness of Breath and/or Wheezing  mometasone 50 MICROgram(s)/spray Nasal Spray 2 Spray(s) Nasal daily PRN prn for nasal congestion  simethicone 80 milliGRAM(s) Chew four times a day PRN Gas    Home Medications:  Home Medications:  albuterol 90 mcg/inh inhalation aerosol: 2 puff(s) inhaled every 6 hours, As Needed (23 Dec 2020 17:34)  aspirin 81 mg oral tablet: 1 tab(s) orally once a day (23 Dec 2020 17:34)  atorvastatin 40 mg oral tablet: 1 tab(s) orally once a day (23 Dec 2020 17:34)  calcium acetate 667 mg oral tablet: 1 tab(s) orally once a day (23 Dec 2020 17:34)  clonazePAM 0.25 mg oral tablet: 1 tab(s) orally 2 times a day (23 Dec 2020 17:34)  Coreg 6.25 mg oral tablet: 1 tab(s) orally 2 times a day (23 Dec 2020 17:34)  folic acid 1 mg oral tablet: 2 tab(s) orally once a day (23 Dec 2020 17:34)  Incruse Ellipta 62.5 mcg/inh inhalation powder: 1 puff(s) inhaled every 24 hours (23 Dec 2020 17:34)  lamoTRIgine 200 mg oral tablet: 1 tab(s) orally 2 times a day (23 Dec 2020 17:34)  methotrexate 2.5 mg oral tablet: 1 tab(s) orally once a week On   (23 Dec 2020 17:34)  paliperidone 3 mg oral tablet, extended release: 1 tab(s) orally once a day (in the morning) (23 Dec 2020 17:34)  predniSONE 5 mg oral tablet: 1 tab(s) orally once a day (23 Dec 2020 17:34)  Vitamin B12 1000 mcg oral tablet: 1 tab(s) orally once a day (23 Dec 2020 17:34)  Vitamin D3 1000 intl units (25 mcg) oral tablet: 1 tab(s) orally once a day (23 Dec 2020 17:34)      VITALS:  Daily     Daily   T(F): 96.1 (20 @ 05:27), Max: 96.3 (20 @ 20:37)  HR: 63 (20 @ 05:27)  BP: 131/65 (20 @ 05:27)  RR: 18 (20 @ 05:27)  SpO2: --  Wt(kg): --  I&O's Detail    27 Dec 2020 07:  -  28 Dec 2020 07:00  --------------------------------------------------------  IN:  Total IN: 0 mL    OUT:    Voided (mL): 400 mL  Total OUT: 400 mL    Total NET: -400 mL        I&O's Summary    27 Dec 2020 07:01  -  28 Dec 2020 07:00  --------------------------------------------------------  IN: 0 mL / OUT: 400 mL / NET: -400 mL          PHYSICAL EXAM:  Gen: NAD  Resp:   Card: S1/S2  Abd: soft  Extremities: edema      LABS:        134<L>  |  101  |  16  ----------------------------<  88  4.3   |  26  |  0.7    Ca    8.0<L>      28 Dec 2020 07:48  Phos  2.8       Mg     1.9           Phosphorus Level, Serum: 2.8 mg/dL (20 @ 07:48)  Phosphorus Level, Serum: 2.6 mg/dL (20 @ 07:40)  Phosphorus Level, Serum: 1.8 mg/dL (20 @ 07:26)                          9.6    11.73 )-----------( 250      ( 28 Dec 2020 07:48 )             29.8     Mean Cell Volume: 101.4 fL (20 @ 07:48)  Mean Cell Volume: 100.3 fL (20 @ 07:40)  Mean Cell Volume: 100.0 fL (20 @ 07:26)        Urine Studies:  Urinalysis Basic - ( 23 Dec 2020 12:15 )    Color: Yellow / Appearance: Cloudy / S.025 / pH:   Gluc:  / Ketone: 40  / Bili: Negative / Urobili: 0.2   Blood:  / Protein: 100 / Nitrite: Positive   Leuk Esterase: Large / RBC: 11-25 /HPF / WBC >50 /HPF   Sq Epi:  / Non Sq Epi: Few /HPF / Bacteria: TNTC /HPF      Sodium, Random Urine: 44.0 mmoL/L ( @ 01:45)  Osmolality, Random Urine: 384 mos/kg ( @ 01:45)      Creatinine trend:  Creatinine, Serum: 0.7 mg/dL (20 @ 07:48)  Creatinine, Serum: 0.8 mg/dL (20 @ 07:40)  Creatinine, Serum: 0.9 mg/dL (20 @ 07:26)  Creatinine, Serum: 0.8 mg/dL (20 @ 08:15)  Creatinine, Serum: 0.9 mg/dL (20 @ 15:54)  Creatinine, Serum: 0.8 mg/dL (20 @ 08:29)  Creatinine, Serum: 0.7 mg/dL (20 @ 03:20)  Creatinine, Serum: 1.0 mg/dL (20 @ 12:15)           Nephrology Progress Note    ABIDA JONES  MRN-[id number]  70y  Male    S:  Patient is seen and examined, events over the last 24h noted.    O:  Allergies:  Lasix (Blisters)    Hospital Medications:   MaleMEDICATIONS  (STANDING):  aspirin  chewable 81 milliGRAM(s) Oral daily  atorvastatin Oral Tab/Cap - Peds 40 milliGRAM(s) Oral daily  cefTRIAXone   IVPB 1000 milliGRAM(s) IV Intermittent every 24 hours  chlorhexidine 4% Liquid 1 Application(s) Topical <User Schedule>  cholecalciferol 1000 Unit(s) Oral daily  clonazePAM  Tablet 0.25 milliGRAM(s) Oral two times a day  cyanocobalamin 1000 MICROGram(s) Oral daily  folic acid 2 milliGRAM(s) Oral daily  heparin   Injectable 5000 Unit(s) SubCutaneous every 12 hours  INCRUSE ELLIPTA 62.5MCG 1 Inhalation 1 Inhalation Inhalation daily  lamoTRIgine 200 milliGRAM(s) Oral two times a day  methotrexate 2.5 milliGRAM(s) Oral <User Schedule>  ondansetron Injectable 4 milliGRAM(s) IV Push once  paliperidone ER. 3 milliGRAM(s) Oral daily  pantoprazole    Tablet 40 milliGRAM(s) Oral before breakfast  predniSONE   Tablet 5 milliGRAM(s) Oral daily  vancomycin    Solution 125 milliGRAM(s) Oral daily    MEDICATIONS  (PRN):  ALBUTerol    90 MICROgram(s) HFA Inhaler 2 Puff(s) Inhalation every 6 hours PRN Shortness of Breath and/or Wheezing  mometasone 50 MICROgram(s)/spray Nasal Spray 2 Spray(s) Nasal daily PRN prn for nasal congestion  simethicone 80 milliGRAM(s) Chew four times a day PRN Gas    Home Medications:  Home Medications:  albuterol 90 mcg/inh inhalation aerosol: 2 puff(s) inhaled every 6 hours, As Needed (23 Dec 2020 17:34)  aspirin 81 mg oral tablet: 1 tab(s) orally once a day (23 Dec 2020 17:34)  atorvastatin 40 mg oral tablet: 1 tab(s) orally once a day (23 Dec 2020 17:34)  calcium acetate 667 mg oral tablet: 1 tab(s) orally once a day (23 Dec 2020 17:34)  clonazePAM 0.25 mg oral tablet: 1 tab(s) orally 2 times a day (23 Dec 2020 17:34)  Coreg 6.25 mg oral tablet: 1 tab(s) orally 2 times a day (23 Dec 2020 17:34)  folic acid 1 mg oral tablet: 2 tab(s) orally once a day (23 Dec 2020 17:34)  Incruse Ellipta 62.5 mcg/inh inhalation powder: 1 puff(s) inhaled every 24 hours (23 Dec 2020 17:34)  lamoTRIgine 200 mg oral tablet: 1 tab(s) orally 2 times a day (23 Dec 2020 17:34)  methotrexate 2.5 mg oral tablet: 1 tab(s) orally once a week On   (23 Dec 2020 17:34)  paliperidone 3 mg oral tablet, extended release: 1 tab(s) orally once a day (in the morning) (23 Dec 2020 17:34)  predniSONE 5 mg oral tablet: 1 tab(s) orally once a day (23 Dec 2020 17:34)  Vitamin B12 1000 mcg oral tablet: 1 tab(s) orally once a day (23 Dec 2020 17:34)  Vitamin D3 1000 intl units (25 mcg) oral tablet: 1 tab(s) orally once a day (23 Dec 2020 17:34)      VITALS:  Daily     Daily   T(F): 96.1 (20 @ 05:27), Max: 96.3 (20 @ 20:37)  HR: 63 (20 @ 05:27)  BP: 131/65 (20 @ 05:27)  RR: 18 (20 @ 05:27)  SpO2: --  Wt(kg): --  I&O's Detail    27 Dec 2020 07:  -  28 Dec 2020 07:00  --------------------------------------------------------  IN:  Total IN: 0 mL    OUT:    Voided (mL): 400 mL  Total OUT: 400 mL    Total NET: -400 mL        I&O's Summary    27 Dec 2020 07:01  -  28 Dec 2020 07:00  --------------------------------------------------------  IN: 0 mL / OUT: 400 mL / NET: -400 mL          PHYSICAL EXAM:  Gen: NAD  Resp: CTAB  Card: S1/S2  Abd: soft  Extremities: no edema      LABS:        134<L>  |  101  |  16  ----------------------------<  88  4.3   |  26  |  0.7    Ca    8.0<L>      28 Dec 2020 07:48  Phos  2.8       Mg     1.9           Phosphorus Level, Serum: 2.8 mg/dL (20 @ 07:48)  Phosphorus Level, Serum: 2.6 mg/dL (20 @ 07:40)  Phosphorus Level, Serum: 1.8 mg/dL (20 @ 07:26)                          9.6    11.73 )-----------( 250      ( 28 Dec 2020 07:48 )             29.8     Mean Cell Volume: 101.4 fL (20 @ 07:48)  Mean Cell Volume: 100.3 fL (20 @ 07:40)  Mean Cell Volume: 100.0 fL (20 @ 07:26)      Urine Studies:  Urinalysis Basic - ( 23 Dec 2020 12:15 )    Color: Yellow / Appearance: Cloudy / S.025 / pH:   Gluc:  / Ketone: 40  / Bili: Negative / Urobili: 0.2   Blood:  / Protein: 100 / Nitrite: Positive   Leuk Esterase: Large / RBC: 11-25 /HPF / WBC >50 /HPF   Sq Epi:  / Non Sq Epi: Few /HPF / Bacteria: TNTC /HPF      Sodium, Random Urine: 44.0 mmoL/L ( @ 01:45)  Osmolality, Random Urine: 384 mos/kg ( @ 01:45)      Creatinine trend:  Creatinine, Serum: 0.7 mg/dL (20 @ 07:48)  Creatinine, Serum: 0.8 mg/dL (20 @ 07:40)  Creatinine, Serum: 0.9 mg/dL (20 @ 07:26)  Creatinine, Serum: 0.8 mg/dL (20 @ 08:15)  Creatinine, Serum: 0.9 mg/dL (20 @ 15:54)  Creatinine, Serum: 0.8 mg/dL (20 @ 08:29)  Creatinine, Serum: 0.7 mg/dL (20 @ 03:20)  Creatinine, Serum: 1.0 mg/dL (20 @ 12:15)           Nephrology Progress Note    ABIDA JONES  MRN-[id number]  70y  Male    S:  Patient is seen and examined, events over the last 24h noted.    O:  Allergies:  Lasix (Blisters)    Hospital Medications:   MaleMEDICATIONS  (STANDING):  aspirin  chewable 81 milliGRAM(s) Oral daily  atorvastatin Oral Tab/Cap - Peds 40 milliGRAM(s) Oral daily  cefTRIAXone   IVPB 1000 milliGRAM(s) IV Intermittent every 24 hours  chlorhexidine 4% Liquid 1 Application(s) Topical <User Schedule>  cholecalciferol 1000 Unit(s) Oral daily  clonazePAM  Tablet 0.25 milliGRAM(s) Oral two times a day  cyanocobalamin 1000 MICROGram(s) Oral daily  folic acid 2 milliGRAM(s) Oral daily  heparin   Injectable 5000 Unit(s) SubCutaneous every 12 hours  INCRUSE ELLIPTA 62.5MCG 1 Inhalation 1 Inhalation Inhalation daily  lamoTRIgine 200 milliGRAM(s) Oral two times a day  methotrexate 2.5 milliGRAM(s) Oral <User Schedule>  ondansetron Injectable 4 milliGRAM(s) IV Push once  paliperidone ER. 3 milliGRAM(s) Oral daily  pantoprazole    Tablet 40 milliGRAM(s) Oral before breakfast  predniSONE   Tablet 5 milliGRAM(s) Oral daily  vancomycin    Solution 125 milliGRAM(s) Oral daily    MEDICATIONS  (PRN):  ALBUTerol    90 MICROgram(s) HFA Inhaler 2 Puff(s) Inhalation every 6 hours PRN Shortness of Breath and/or Wheezing  mometasone 50 MICROgram(s)/spray Nasal Spray 2 Spray(s) Nasal daily PRN prn for nasal congestion  simethicone 80 milliGRAM(s) Chew four times a day PRN Gas    Home Medications:  Home Medications:  albuterol 90 mcg/inh inhalation aerosol: 2 puff(s) inhaled every 6 hours, As Needed (23 Dec 2020 17:34)  aspirin 81 mg oral tablet: 1 tab(s) orally once a day (23 Dec 2020 17:34)  atorvastatin 40 mg oral tablet: 1 tab(s) orally once a day (23 Dec 2020 17:34)  calcium acetate 667 mg oral tablet: 1 tab(s) orally once a day (23 Dec 2020 17:34)  clonazePAM 0.25 mg oral tablet: 1 tab(s) orally 2 times a day (23 Dec 2020 17:34)  Coreg 6.25 mg oral tablet: 1 tab(s) orally 2 times a day (23 Dec 2020 17:34)  folic acid 1 mg oral tablet: 2 tab(s) orally once a day (23 Dec 2020 17:34)  Incruse Ellipta 62.5 mcg/inh inhalation powder: 1 puff(s) inhaled every 24 hours (23 Dec 2020 17:34)  lamoTRIgine 200 mg oral tablet: 1 tab(s) orally 2 times a day (23 Dec 2020 17:34)  methotrexate 2.5 mg oral tablet: 1 tab(s) orally once a week On   (23 Dec 2020 17:34)  paliperidone 3 mg oral tablet, extended release: 1 tab(s) orally once a day (in the morning) (23 Dec 2020 17:34)  predniSONE 5 mg oral tablet: 1 tab(s) orally once a day (23 Dec 2020 17:34)  Vitamin B12 1000 mcg oral tablet: 1 tab(s) orally once a day (23 Dec 2020 17:34)  Vitamin D3 1000 intl units (25 mcg) oral tablet: 1 tab(s) orally once a day (23 Dec 2020 17:34)      VITALS:  Daily     Daily   T(F): 96.1 (20 @ 05:27), Max: 96.3 (20 @ 20:37)  HR: 63 (20 @ 05:27)  BP: 131/65 (20 @ 05:27)  RR: 18 (20 @ 05:27)  SpO2: --  Wt(kg): --  I&O's Detail    27 Dec 2020 07:  -  28 Dec 2020 07:00  --------------------------------------------------------  IN:  Total IN: 0 mL    OUT:    Voided (mL): 400 mL  Total OUT: 400 mL    Total NET: -400 mL        I&O's Summary    27 Dec 2020 07:01  -  28 Dec 2020 07:00  --------------------------------------------------------  IN: 0 mL / OUT: 400 mL / NET: -400 mL          PHYSICAL EXAM:  Gen: NAD  Resp: CTAB  Card: S1/S2  Abd: soft  Extremities: no edema      LABS:        134<L>  |  101  |  16  ----------------------------<  88  4.3   |  26  |  0.7    Ca    8.0<L>      28 Dec 2020 07:48  Phos  2.8       Mg     1.9           Phosphorus Level, Serum: 2.8 mg/dL (20 @ 07:48)  Phosphorus Level, Serum: 2.6 mg/dL (20 @ 07:40)  Phosphorus Level, Serum: 1.8 mg/dL (20 @ 07:26)                          9.6    11.73 )-----------( 250      ( 28 Dec 2020 07:48 )             29.8     Mean Cell Volume: 101.4 fL (20 @ 07:48)  Mean Cell Volume: 100.3 fL (20 @ 07:40)  Mean Cell Volume: 100.0 fL (20 @ 07:26)      Urine Studies:  Urinalysis Basic - ( 23 Dec 2020 12:15 )    Color: Yellow / Appearance: Cloudy / S.025 / pH:   Gluc:  / Ketone: 40  / Bili: Negative / Urobili: 0.2   Blood:  / Protein: 100 / Nitrite: Positive   Leuk Esterase: Large / RBC: 11-25 /HPF / WBC >50 /HPF   Sq Epi:  / Non Sq Epi: Few /HPF / Bacteria: TNTC /HPF      Sodium, Random Urine: 44.0 mmoL/L ( 01:45)  Osmolality, Random Urine: 384 mos/kg ( 01:45)    Sodium, Serum: 134 mmol/L (20 @ 07:48)  Sodium, Serum: 134 mmol/L (20 @ 07:40)  Sodium, Serum: 127 mmol/L (20 @ 07:26)  Sodium, Serum: 129 mmol/L (20 @ 08:15)  Sodium, Serum: 125 mmol/L (20 @ 15:54)  Sodium, Serum: 118 mmol/L (20 @ 08:29)  Sodium, Serum: 116 mmol/L (20 @ 03:20)  Sodium, Serum: 116 mmol/L (20 @ 12:15)      Creatinine trend:  Creatinine, Serum: 0.7 mg/dL (20 @ 07:48)  Creatinine, Serum: 0.8 mg/dL (20 @ 07:40)  Creatinine, Serum: 0.9 mg/dL (20 @ 07:26)  Creatinine, Serum: 0.8 mg/dL (20 @ 08:15)  Creatinine, Serum: 0.9 mg/dL (20 @ 15:54)  Creatinine, Serum: 0.8 mg/dL (20 @ 08:29)  Creatinine, Serum: 0.7 mg/dL (20 @ 03:20)  Creatinine, Serum: 1.0 mg/dL (20 @ 12:15)    Thyroid Stimulating Hormone, Serum in AM (20 @ 08:29)    Thyroid Stimulating Hormone, Serum: 3.96 uIU/mL  Cortisol AM, Serum (20 @ 08:29)    Cortisol AM, Serum: 71.0: Test Repeated ug/dL      Culture Results:   GI PCR Results: NOT detected  *******Please Note:*******  GI panel PCR evaluates for:  Campylobacter, Plesiomonas shigelloides, Salmonella,  Vibrio, Yersinia enterocolitica, Enteroaggregative  Escherichia coli (EAEC), Enteropathogenic E.coli (EPEC),  Enterotoxigenic E. coli (ETEC) lt/st, Shiga-like  toxin-producing E. coli (STEC) stx1/stx2,  Shigella/ Enteroinvasive E. coli (EIEC), Cryptosporidium,  Cyclospora cayetanensis, Entamoeba histolytica,  Giardia lamblia, Adenovirus F 40/41, Astrovirus,  Norovirus GI/GII, Rotavirus A, Sapovirus (20 @ 21:41)      Culture - Urine (12.23.20 @ 12:20)    -  Amikacin: S <=16    -  Amoxicillin/Clavulanic Acid: S <=8/4    -  Ampicillin: R 16 These ampicillin results predict results for amoxicillin    -  Ampicillin/Sulbactam: S <=4/2 Enterobacter, Citrobacter, and Serratia may develop resistance during prolonged therapy (3-4 days)    -  Aztreonam: S <=4    -  Cefazolin: S <=2 ([id number]_[id number]) For uncomplicated UTI with K. pneumoniae, E. coli, or P. mirablis: PREMA <=16 is sensitive and PREMA >=32 is resistant. This also predicts results for oral agents cefaclor, cefdinir, cefpodoxime, cefprozil, cefuroxime axetil, cephalexin and locarbef for uncomplicated UTI. Note that some isolates may be susceptible to these agents while testing resistant to cefazolin.    -  Cefepime: S <=2    -  Cefoxitin: S <=8    -  Ceftriaxone: S <=1 Enterobacter, Citrobacter, and Serratia may develop resistance during prolonged therapy    -  Ciprofloxacin: S <=0.25    -  Ertapenem: S <=0.5    -  Gentamicin: S <=2    -  Imipenem: S <=1    -  Levofloxacin: S <=0.5    -  Meropenem: S <=1    -  Nitrofurantoin: S <=32 Should not be used to treat pyelonephritis    -  Piperacillin/Tazobactam: S <=8    -  Tigecycline: S <=2    -  Tobramycin: S <=2    -  Trimethoprim/Sulfamethoxazole: S <=0.5/9.5    Specimen Source: .Urine Clean Catch (Midstream)    Culture Results:   >100,000 CFU/ml Klebsiella pneumoniae    Organism Identification: Klebsiella pneumoniae    Organism: Klebsiella pneumoniae    Method Type: PREMA          < from: CT Abdomen and Pelvis w/ IV Cont (20 @ 15:23) >    EXAM:  CT ABDOMEN AND PELVIS IC            PROCEDURE DATE:  2020            INTERPRETATION:  CLINICAL STATEMENT: Vomiting. Sepsis.    TECHNIQUE: Contiguous axial CT images were obtained from the lower chest to the pubic symphysis following administration of 100cc Optiray 320 intravenous contrast.  Oral contrast was not administered.  Reformatted images in the coronal and sagittal planes were acquired.    COMPARISON CT: None.    OTHER STUDIES USED FOR CORRELATION: None.      FINDINGS:    LOWER CHEST: Trace bilateral pleural effusions with bibasilar compressive atelectasis. Partially imaged pacer lead within the right ventricle.    HEPATOBILIARY: Mild periportal edema.    SPLEEN: Unremarkable.    PANCREAS: Unremarkable.    ADRENAL GLANDS: Unremarkable.    KIDNEYS: A 1.1 cm left renal hypodensity is incompletely characterized. No hydronephrosis.    ABDOMINOPELVIC NODES: Unremarkable.    PELVIC ORGANS: Unremarkable.    PERITONEUM/MESENTERY/BOWEL: Small left inguinal hernia containing a short segment of nonobstructed sigmoid colon. Moderate diffuse colonic wall thickening is noted, with associated pericolonic inflammation, compatible with acute colitis. Small volume abdominopelvic ascites. No evidence of bowel obstruction. No free intraperitoneal air.    BONES/SOFT TISSUES: No acute osseous abnormality. Multiple chronic right-sided rib fractures and chronic fracture deformity of the left pubic bone.    OTHER: Atherosclerotic vascular calcification.      IMPRESSION:    1. Moderate diffuse colonic wall thickening, with associated pericolic inflammation, compatible with acute colitis.    2. Small volume abdominopelvic ascites.    3. Small left inguinal hernia containing a short segment of nonobstructed sigmoid colon.    4. Small bilateral pleural effusions with bibasilar compressive atelectasis.      ELMER BEST MD; Attending Radiologist  This document has been electronically signed. Dec 23 2020  4:03PM    < end of copied text >

## 2020-12-28 NOTE — PROGRESS NOTE ADULT - SUBJECTIVE AND OBJECTIVE BOX
INTERVAL HPI/OVERNIGHT EVENTS:    SUBJECTIVE: Patient seen and examined at bedside.     no cp, sob, abd pain, fever  no fever, lightheadedness, dizziness, ha    OBJECTIVE:    VITAL SIGNS:  Vital Signs Last 24 Hrs  T(C): 35.6 (28 Dec 2020 05:27), Max: 35.7 (27 Dec 2020 20:37)  T(F): 96.1 (28 Dec 2020 05:27), Max: 96.3 (27 Dec 2020 20:37)  HR: 63 (28 Dec 2020 05:27) (60 - 63)  BP: 131/65 (28 Dec 2020 05:27) (113/58 - 131/65)  BP(mean): --  RR: 18 (28 Dec 2020 05:27) (16 - 18)  SpO2: --      PHYSICAL EXAM:    General: NAD  HEENT: NC/AT; PERRL, clear conjunctiva  Neck: supple  Respiratory: CTA b/l  Cardiovascular: +S1/S2; RRR  Abdomen: soft, NT/ND; +BS x4  Extremities: WWP, 2+ peripheral pulses b/l; no LE edema  Skin: normal color and turgor; no rash  Neurological:    MEDICATIONS:  MEDICATIONS  (STANDING):  aspirin  chewable 81 milliGRAM(s) Oral daily  atorvastatin Oral Tab/Cap - Peds 40 milliGRAM(s) Oral daily  cefTRIAXone   IVPB 1000 milliGRAM(s) IV Intermittent every 24 hours  chlorhexidine 4% Liquid 1 Application(s) Topical <User Schedule>  cholecalciferol 1000 Unit(s) Oral daily  clonazePAM  Tablet 0.25 milliGRAM(s) Oral two times a day  cyanocobalamin 1000 MICROGram(s) Oral daily  folic acid 2 milliGRAM(s) Oral daily  heparin   Injectable 5000 Unit(s) SubCutaneous every 12 hours  INCRUSE ELLIPTA 62.5MCG 1 Inhalation 1 Inhalation Inhalation daily  lamoTRIgine 200 milliGRAM(s) Oral two times a day  methotrexate 2.5 milliGRAM(s) Oral <User Schedule>  ondansetron Injectable 4 milliGRAM(s) IV Push once  paliperidone ER. 3 milliGRAM(s) Oral daily  pantoprazole    Tablet 40 milliGRAM(s) Oral before breakfast  predniSONE   Tablet 5 milliGRAM(s) Oral daily  vancomycin    Solution 125 milliGRAM(s) Oral daily    MEDICATIONS  (PRN):  ALBUTerol    90 MICROgram(s) HFA Inhaler 2 Puff(s) Inhalation every 6 hours PRN Shortness of Breath and/or Wheezing  mometasone 50 MICROgram(s)/spray Nasal Spray 2 Spray(s) Nasal daily PRN prn for nasal congestion  simethicone 80 milliGRAM(s) Chew four times a day PRN Gas      ALLERGIES:  Allergies    Lasix (Blisters)    Intolerances        LABS:                        9.6    11.73 )-----------( 250      ( 28 Dec 2020 07:48 )             29.8     Hemoglobin: 9.6 g/dL (12-28 @ 07:48)  Hemoglobin: 9.8 g/dL (12-27 @ 07:40)  Hemoglobin: 8.5 g/dL (12-26 @ 07:26)  Hemoglobin: 8.5 g/dL (12-25 @ 08:15)  Hemoglobin: 10.2 g/dL (12-24 @ 08:29)    CBC Full  -  ( 28 Dec 2020 07:48 )  WBC Count : 11.73 K/uL  RBC Count : 2.94 M/uL  Hemoglobin : 9.6 g/dL  Hematocrit : 29.8 %  Platelet Count - Automated : 250 K/uL  Mean Cell Volume : 101.4 fL  Mean Cell Hemoglobin : 32.7 pg  Mean Cell Hemoglobin Concentration : 32.2 g/dL  Auto Neutrophil # : x  Auto Lymphocyte # : x  Auto Monocyte # : x  Auto Eosinophil # : x  Auto Basophil # : x  Auto Neutrophil % : x  Auto Lymphocyte % : x  Auto Monocyte % : x  Auto Eosinophil % : x  Auto Basophil % : x    12-28    134<L>  |  101  |  16  ----------------------------<  88  4.3   |  26  |  0.7    Ca    8.0<L>      28 Dec 2020 07:48  Phos  2.8     12-28  Mg     1.9     12-28      Creatinine Trend: 0.7<--, 0.8<--, 0.9<--, 0.8<--, 0.9<--, 0.8<--        hs Troponin:              CSF:                      EKG:   MICROBIOLOGY:    IMAGING:      Labs, imaging, EKG personally reviewed    RADIOLOGY & ADDITIONAL TESTS: Reviewed.

## 2020-12-28 NOTE — PROGRESS NOTE ADULT - ASSESSMENT
Pt with CHF, COPD, epilepsy (on Lamotrigine) recent C. diff colitis admitted with UTI found to have hyponatremia of 116.    Hyponatremia chronic - as per pt, Na usually in high 120  asymptomatic now  - likely multifactorial - sepsis with volume depletion, recent diarrhea, possible SIADH (lamotrigine), CHF  - need Urine Osm and serum OSm, urine Na and serum uric acid to r/o SIADH  - agree with NS 75 cc/hr-100 cc/hr; Na twice a day; if NA starts to decrease D/C IVF  - cont NaCL 1 tab tid  - free water restrict 1 l daily  ->118 today  check TSH cortisol level  CT abd noted small pl effusions and ascites    UTI - on Ceftriaxone  no hydro on CT    Colitis - on po Vanco    Will follow  Thank you   70M with PMH of CHF, COPD, epilepsy (on Lamotrigine) recent C. diff colitis admitted for sepsis, found to have hyponatremia of 116, ROSANNE.    # ROSANNE - likely pre-renal iso sepsis  - improved with iv hydration  - non oliguric  - UA with proteinuria, obtain urine pr/cr ratio  - CTAP w/o hydronephrosis   # Acute on chronic hyponatremia, hypo-osmolar, asymptomatic   - high urine osm, high urine Na (after iv crystalloids) c/w high ADH state  - likely multifactorial - sepsis with volume depletion, recent diarrhea, possible SIADH (lamotrigine), CHF  - avoid further iv hydration and salt tabs  - restrict free water intake to 1L daily  - r/o endocrinopathies, TSH/AM cortisol not c/f hypothyroidism/adrenal insufficiency  # E. coli bacteriuria - on Ceftriaxone  # Previous c.diff colitis - on po vancomycin  Recall nephrology PRN

## 2020-12-28 NOTE — PHARMACY COMMUNICATION NOTE - REASON FOR NOTE
[id number]

DocumentID: [id number]

Dictated Date:  06/08/2018 08:58:54

Transcription Date: 06/08/2018 15:26:12

Dictated By: KRISTIAN BAUTISTA MD Methotrexate Dose

## 2020-12-28 NOTE — PHARMACY COMMUNICATION NOTE - COMMENTS
As per Dr. Paz ([id number]): patient is getting 5 of the 2.5 mg tab for a total dose of 12.5 mg taken every monday (yhis is confirmed w/ dr. paz)

## 2020-12-29 VITALS
SYSTOLIC BLOOD PRESSURE: 138 MMHG | HEART RATE: 71 BPM | RESPIRATION RATE: 16 BRPM | DIASTOLIC BLOOD PRESSURE: 68 MMHG | TEMPERATURE: 96 F

## 2020-12-29 LAB
ANION GAP SERPL CALC-SCNC: 9 MMOL/L — SIGNIFICANT CHANGE UP (ref 7–14)
BUN SERPL-MCNC: 18 MG/DL — SIGNIFICANT CHANGE UP (ref 10–20)
CALCIUM SERPL-MCNC: 8.2 MG/DL — LOW (ref 8.5–10.1)
CHLORIDE SERPL-SCNC: 98 MMOL/L — SIGNIFICANT CHANGE UP (ref 98–110)
CO2 SERPL-SCNC: 26 MMOL/L — SIGNIFICANT CHANGE UP (ref 17–32)
CREAT SERPL-MCNC: 0.8 MG/DL — SIGNIFICANT CHANGE UP (ref 0.7–1.5)
CULTURE RESULTS: SIGNIFICANT CHANGE UP
CULTURE RESULTS: SIGNIFICANT CHANGE UP
GLUCOSE SERPL-MCNC: 81 MG/DL — SIGNIFICANT CHANGE UP (ref 70–99)
POTASSIUM SERPL-MCNC: 4.3 MMOL/L — SIGNIFICANT CHANGE UP (ref 3.5–5)
POTASSIUM SERPL-SCNC: 4.3 MMOL/L — SIGNIFICANT CHANGE UP (ref 3.5–5)
SODIUM SERPL-SCNC: 133 MMOL/L — LOW (ref 135–146)
SPECIMEN SOURCE: SIGNIFICANT CHANGE UP
SPECIMEN SOURCE: SIGNIFICANT CHANGE UP

## 2020-12-29 PROCEDURE — 99239 HOSP IP/OBS DSCHRG MGMT >30: CPT

## 2020-12-29 RX ORDER — CEFTRIAXONE 500 MG/1
1 INJECTION, POWDER, FOR SOLUTION INTRAMUSCULAR; INTRAVENOUS
Qty: 1 | Refills: 0
Start: 2020-12-29

## 2020-12-29 RX ORDER — METHOTREXATE 2.5 MG/1
1 TABLET ORAL
Qty: 0 | Refills: 0 | DISCHARGE

## 2020-12-29 RX ORDER — VANCOMYCIN HCL 1 G
125 VIAL (EA) INTRAVENOUS
Qty: 750 | Refills: 0
Start: 2020-12-29 | End: 2021-01-03

## 2020-12-29 RX ORDER — METHOTREXATE 2.5 MG/1
5 TABLET ORAL
Qty: 0 | Refills: 0 | DISCHARGE
Start: 2020-12-29

## 2020-12-29 RX ORDER — CALCIUM ACETATE 667 MG
1 TABLET ORAL
Qty: 0 | Refills: 0 | DISCHARGE

## 2020-12-29 RX ORDER — CARVEDILOL PHOSPHATE 80 MG/1
1 CAPSULE, EXTENDED RELEASE ORAL
Qty: 0 | Refills: 0 | DISCHARGE

## 2020-12-29 RX ADMIN — LAMOTRIGINE 200 MILLIGRAM(S): 25 TABLET, ORALLY DISINTEGRATING ORAL at 05:49

## 2020-12-29 RX ADMIN — Medication 5 MILLIGRAM(S): at 05:58

## 2020-12-29 RX ADMIN — Medication 81 MILLIGRAM(S): at 11:53

## 2020-12-29 RX ADMIN — Medication 125 MILLIGRAM(S): at 11:53

## 2020-12-29 RX ADMIN — PANTOPRAZOLE SODIUM 40 MILLIGRAM(S): 20 TABLET, DELAYED RELEASE ORAL at 05:49

## 2020-12-29 RX ADMIN — HEPARIN SODIUM 5000 UNIT(S): 5000 INJECTION INTRAVENOUS; SUBCUTANEOUS at 05:57

## 2020-12-29 RX ADMIN — CHLORHEXIDINE GLUCONATE 1 APPLICATION(S): 213 SOLUTION TOPICAL at 05:48

## 2020-12-29 RX ADMIN — CEFTRIAXONE 100 MILLIGRAM(S): 500 INJECTION, POWDER, FOR SOLUTION INTRAMUSCULAR; INTRAVENOUS at 12:22

## 2020-12-29 RX ADMIN — Medication 1000 UNIT(S): at 11:53

## 2020-12-29 RX ADMIN — Medication 0.25 MILLIGRAM(S): at 05:57

## 2020-12-29 RX ADMIN — PREGABALIN 1000 MICROGRAM(S): 225 CAPSULE ORAL at 11:52

## 2020-12-29 RX ADMIN — ATORVASTATIN CALCIUM 40 MILLIGRAM(S): 80 TABLET, FILM COATED ORAL at 11:54

## 2020-12-29 RX ADMIN — Medication 2 MILLIGRAM(S): at 11:53

## 2020-12-29 RX ADMIN — PALIPERIDONE 3 MILLIGRAM(S): 1.5 TABLET, EXTENDED RELEASE ORAL at 11:53

## 2020-12-29 NOTE — DISCHARGE NOTE PROVIDER - NSDCCPCAREPLAN_GEN_ALL_CORE_FT
PRINCIPAL DISCHARGE DIAGNOSIS  Diagnosis: UTI (urinary tract infection)  Assessment and Plan of Treatment: PLEASE FOLLOW UP WITH YOUR PRIMARY CARE DOCTOR, NEPHROLOGIST, CARDIOLOGSIT IN ONE WEEK.      SECONDARY DISCHARGE DIAGNOSES  Diagnosis: Elevated troponin  Assessment and Plan of Treatment:     Diagnosis: Hyponatremia  Assessment and Plan of Treatment:     Diagnosis: UTI (urinary tract infection)  Assessment and Plan of Treatment:

## 2020-12-29 NOTE — DISCHARGE NOTE PROVIDER - CARE PROVIDER_API CALL
Derrick Luis ()  Medicine  Physicians  [address] [place] [address]  Phone: [phone]  Fax: [phone]  Follow Up Time:     MARIANELA JEONG  Cardiovascular Disease  [address] [place] [address]  Phone: [phone]  Fax: [phone]  Follow Up Time:     Alexandra Mix)  Internal Medicine  [address] [place] [address]  Phone: [phone]  Fax: [phone]  Follow Up Time:

## 2020-12-29 NOTE — DISCHARGE NOTE PROVIDER - PROVIDER TOKENS
PROVIDER:[TOKEN:[[id number]]],PROVIDER:[TOKEN:[[id number]]],PROVIDER:[TOKEN:[[id number]]]

## 2020-12-29 NOTE — PROGRESS NOTE ADULT - REASON FOR ADMISSION
UTI sepsis

## 2020-12-29 NOTE — PROGRESS NOTE ADULT - PROVIDER SPECIALTY LIST ADULT
Internal Medicine
Infectious Disease
Internal Medicine
Nephrology
Infectious Disease
Internal Medicine
Internal Medicine
Nephrology

## 2020-12-29 NOTE — PROGRESS NOTE ADULT - ASSESSMENT
70M PMHx HFpEF suspected, COPD, CAD, seizure disorder here with severe sepsis, present on admission, presumed due to uti.    #Severe sepsis, presumed due to kleb uti  resolved  ucx kleb pan sensitive, bcx ntd  ceftriaxone to end tm  vanco po 125 for cdiff ppx to end 1/4  appreciate id  discharge planning to SNF  #Hyponatremia, consistent with hypovolemia  near resolved, off ivf  d/w wife, cont invega 3  1L fluid restrict  hold salt tab per wife request.   #COPD  proventil prn  #Psychosis  invega 3   #CAD  lipitor 40  asa  #Seizure disorder  lamictal 200 bid  #HFpEF, suspected; chronic  lasix on hold  cxr noted  #Bullous pemphigoid  prednisone 5  mtx 2.5 qweek  #DVT ppx  subq hep    #Progress Note Handoff:  Pending (specify):  Consults_________, Tests________, Test Results_______, Other___d/c planning______  Family discussion:d/w pt at bedside re: treatment plan, primary dx  Disposition: Home___/SNF___/Other________/Unknown at this time___x_____       70M PMHx HFpEF suspected, COPD, CAD, seizure disorder here with severe sepsis, present on admission, presumed due to uti.    #Severe sepsis, presumed due to kleb uti  resolved  ucx kleb pan sensitive, bcx ntd  ceftriaxone to end tm  vanco po 125 for cdiff ppx to end 1/4  appreciate id  discharge planning to SNF  #Hyponatremia, consistent with hypovolemia  near resolved, off ivf  d/w wife, cont invega 3  1L fluid restrict  hold salt tab per wife request.   #COPD  proventil prn  #Psychosis  invega 3   #CAD  lipitor 40  asa  #Seizure disorder  lamictal 200 bid  #HFpEF, suspected; chronic  lasix on hold  cxr noted  #Bullous pemphigoid  prednisone 5  mtx 12.5 qweek  #DVT ppx  subq hep    #Progress Note Handoff:  Pending (specify):  Consults_________, Tests________, Test Results_______, Other___d/c planning______  Family discussion:d/w pt at bedside re: treatment plan, primary dx  Disposition: Home___/SNF___/Other________/Unknown at this time___x_____

## 2020-12-29 NOTE — DISCHARGE NOTE PROVIDER - NSDCMRMEDTOKEN_GEN_ALL_CORE_FT
albuterol 90 mcg/inh inhalation aerosol: 2 puff(s) inhaled every 6 hours, As Needed  aspirin 81 mg oral tablet: 1 tab(s) orally once a day  atorvastatin 40 mg oral tablet: 1 tab(s) orally once a day  cefTRIAXone 1 g intravenous injection: 1 gram(s) intravenous every 24 hours to end 12/30  clonazePAM 0.25 mg oral tablet: 1 tab(s) orally 2 times a day  FIRST-Vancomycin 25 oral liquid: 125 milliliter(s) orally once a day to end 1/4  folic acid 1 mg oral tablet: 2 tab(s) orally once a day  Incruse Ellipta 62.5 mcg/inh inhalation powder: 1 puff(s) inhaled every 24 hours  lamoTRIgine 200 mg oral tablet: 1 tab(s) orally 2 times a day  methotrexate 2.5 mg oral tablet: 5 tab(s) orally   paliperidone 3 mg oral tablet, extended release: 1 tab(s) orally once a day (in the morning)  predniSONE 5 mg oral tablet: 1 tab(s) orally once a day  Vitamin B12 1000 mcg oral tablet: 1 tab(s) orally once a day  Vitamin D3 1000 intl units (25 mcg) oral tablet: 1 tab(s) orally once a day

## 2020-12-29 NOTE — DISCHARGE NOTE NURSING/CASE MANAGEMENT/SOCIAL WORK - PATIENT PORTAL LINK FT
You can access the FollowMyHealth Patient Portal offered by [facility] by registering at the following website: [email or web address]. By joining [facility]’s FollowMyHealth portal, you will also be able to view your health information using other applications (apps) compatible with our system.

## 2020-12-29 NOTE — DISCHARGE NOTE PROVIDER - HOSPITAL COURSE
70M PMHx HFpEF suspected, COPD, CAD, seizure disorder here with severe sepsis, present on admission. Found to have hypotension, responded to fluid. Noted to have positive ua, started on iv abx, seen by id. Underwent ct abd which demosntrated colitis, in setting of recent cdiff infection. He was started on po vanco for ppx. He was found to have significant hyponatremia, responded to fluids as well, suspected due to dehydration. He improved throughout hospitilization, with resolution of hyponatremia, ucx grew kleb. He was stable for d/c on 12/29, needs outpt pmd, cards, renal f/u one week.    41 minutes spent on discharge planning.

## 2020-12-29 NOTE — PROGRESS NOTE ADULT - SUBJECTIVE AND OBJECTIVE BOX
INTERVAL HPI/OVERNIGHT EVENTS:    SUBJECTIVE: Patient seen and examined at bedside.     no cp, sob, abd pain, fever  no ha, syncope, lightheadedness, dizziness    OBJECTIVE:    VITAL SIGNS:  Vital Signs Last 24 Hrs  T(C): 35.7 (29 Dec 2020 05:00), Max: 36.6 (28 Dec 2020 14:35)  T(F): 96.3 (29 Dec 2020 05:00), Max: 97.9 (28 Dec 2020 14:35)  HR: 71 (29 Dec 2020 05:00) (60 - 77)  BP: 138/68 (29 Dec 2020 05:00) (99/54 - 138/68)  BP(mean): --  RR: 16 (29 Dec 2020 05:00) (16 - 16)  SpO2: 98% (28 Dec 2020 13:46) (98% - 98%)      PHYSICAL EXAM:    General: NAD  HEENT: NC/AT; PERRL, clear conjunctiva  Neck: supple  Respiratory: CTA b/l  Cardiovascular: +S1/S2; RRR  Abdomen: soft, NT/ND; +BS x4  Extremities: WWP, 2+ peripheral pulses b/l; no LE edema  Skin: normal color and turgor; no rash  Neurological:    MEDICATIONS:  MEDICATIONS  (STANDING):  aspirin  chewable 81 milliGRAM(s) Oral daily  atorvastatin Oral Tab/Cap - Peds 40 milliGRAM(s) Oral daily  cefTRIAXone   IVPB 1000 milliGRAM(s) IV Intermittent every 24 hours  chlorhexidine 4% Liquid 1 Application(s) Topical <User Schedule>  cholecalciferol 1000 Unit(s) Oral daily  clonazePAM  Tablet 0.25 milliGRAM(s) Oral two times a day  cyanocobalamin 1000 MICROGram(s) Oral daily  folic acid 2 milliGRAM(s) Oral daily  heparin   Injectable 5000 Unit(s) SubCutaneous every 12 hours  INCRUSE ELLIPTA 62.5MCG 1 Inhalation 1 Inhalation Inhalation daily  lamoTRIgine 200 milliGRAM(s) Oral two times a day  methotrexate 12.5 milliGRAM(s) Oral <User Schedule>  ondansetron Injectable 4 milliGRAM(s) IV Push once  paliperidone ER. 3 milliGRAM(s) Oral daily  pantoprazole    Tablet 40 milliGRAM(s) Oral before breakfast  predniSONE   Tablet 5 milliGRAM(s) Oral daily  vancomycin    Solution 125 milliGRAM(s) Oral daily    MEDICATIONS  (PRN):  ALBUTerol    90 MICROgram(s) HFA Inhaler 2 Puff(s) Inhalation every 6 hours PRN Shortness of Breath and/or Wheezing  mometasone 50 MICROgram(s)/spray Nasal Spray 2 Spray(s) Nasal daily PRN prn for nasal congestion  simethicone 80 milliGRAM(s) Chew four times a day PRN Gas      ALLERGIES:  Allergies    Lasix (Blisters)    Intolerances        LABS:                        9.6    11.73 )-----------( 250      ( 28 Dec 2020 07:48 )             29.8     Hemoglobin: 9.6 g/dL (12-28 @ 07:48)  Hemoglobin: 9.8 g/dL (12-27 @ 07:40)  Hemoglobin: 8.5 g/dL (12-26 @ 07:26)  Hemoglobin: 8.5 g/dL (12-25 @ 08:15)    CBC Full  -  ( 28 Dec 2020 07:48 )  WBC Count : 11.73 K/uL  RBC Count : 2.94 M/uL  Hemoglobin : 9.6 g/dL  Hematocrit : 29.8 %  Platelet Count - Automated : 250 K/uL  Mean Cell Volume : 101.4 fL  Mean Cell Hemoglobin : 32.7 pg  Mean Cell Hemoglobin Concentration : 32.2 g/dL  Auto Neutrophil # : x  Auto Lymphocyte # : x  Auto Monocyte # : x  Auto Eosinophil # : x  Auto Basophil # : x  Auto Neutrophil % : x  Auto Lymphocyte % : x  Auto Monocyte % : x  Auto Eosinophil % : x  Auto Basophil % : x    12-29    133<L>  |  98  |  18  ----------------------------<  81  4.3   |  26  |  0.8    Ca    8.2<L>      29 Dec 2020 07:52  Phos  2.8     12-28  Mg     1.9     12-28      Creatinine Trend: 0.8<--, 0.7<--, 0.8<--, 0.9<--, 0.8<--, 0.9<--        hs Troponin:              CSF:                      EKG:   MICROBIOLOGY:    IMAGING:      Labs, imaging, EKG personally reviewed    RADIOLOGY & ADDITIONAL TESTS: Reviewed.

## 2020-12-31 LAB
ANION GAP SERPL CALC-SCNC: 10 MMOL/L — SIGNIFICANT CHANGE UP (ref 7–14)
BUN SERPL-MCNC: 19 MG/DL — SIGNIFICANT CHANGE UP (ref 10–20)
CALCIUM SERPL-MCNC: 8.6 MG/DL — SIGNIFICANT CHANGE UP (ref 8.5–10.1)
CHLORIDE SERPL-SCNC: 96 MMOL/L — LOW (ref 98–110)
CO2 SERPL-SCNC: 27 MMOL/L — SIGNIFICANT CHANGE UP (ref 17–32)
CREAT SERPL-MCNC: 0.9 MG/DL — SIGNIFICANT CHANGE UP (ref 0.7–1.5)
GLUCOSE SERPL-MCNC: 86 MG/DL — SIGNIFICANT CHANGE UP (ref 70–99)
MAGNESIUM SERPL-MCNC: 2.1 MG/DL — SIGNIFICANT CHANGE UP (ref 1.8–2.4)
PHOSPHATE SERPL-MCNC: 4.4 MG/DL — SIGNIFICANT CHANGE UP (ref 2.1–4.9)
POTASSIUM SERPL-MCNC: 4.9 MMOL/L — SIGNIFICANT CHANGE UP (ref 3.5–5)
POTASSIUM SERPL-SCNC: 4.9 MMOL/L — SIGNIFICANT CHANGE UP (ref 3.5–5)
SODIUM SERPL-SCNC: 133 MMOL/L — LOW (ref 135–146)

## 2021-01-06 DIAGNOSIS — I25.10 ATHEROSCLEROTIC HEART DISEASE OF NATIVE CORONARY ARTERY WITHOUT ANGINA PECTORIS: ICD-10-CM

## 2021-01-06 DIAGNOSIS — I50.32 CHRONIC DIASTOLIC (CONGESTIVE) HEART FAILURE: ICD-10-CM

## 2021-01-06 DIAGNOSIS — E87.1 HYPO-OSMOLALITY AND HYPONATREMIA: ICD-10-CM

## 2021-01-06 DIAGNOSIS — R77.8 OTHER SPECIFIED ABNORMALITIES OF PLASMA PROTEINS: ICD-10-CM

## 2021-01-06 DIAGNOSIS — R65.20 SEVERE SEPSIS WITHOUT SEPTIC SHOCK: ICD-10-CM

## 2021-01-06 DIAGNOSIS — G40.909 EPILEPSY, UNSPECIFIED, NOT INTRACTABLE, WITHOUT STATUS EPILEPTICUS: ICD-10-CM

## 2021-01-06 DIAGNOSIS — B96.1 KLEBSIELLA PNEUMONIAE [K. PNEUMONIAE] AS THE CAUSE OF DISEASES CLASSIFIED ELSEWHERE: ICD-10-CM

## 2021-01-06 DIAGNOSIS — R18.8 OTHER ASCITES: ICD-10-CM

## 2021-01-06 DIAGNOSIS — E86.0 DEHYDRATION: ICD-10-CM

## 2021-01-06 DIAGNOSIS — J44.9 CHRONIC OBSTRUCTIVE PULMONARY DISEASE, UNSPECIFIED: ICD-10-CM

## 2021-01-06 DIAGNOSIS — E86.1 HYPOVOLEMIA: ICD-10-CM

## 2021-01-06 DIAGNOSIS — N39.0 URINARY TRACT INFECTION, SITE NOT SPECIFIED: ICD-10-CM

## 2021-01-06 DIAGNOSIS — I11.0 HYPERTENSIVE HEART DISEASE WITH HEART FAILURE: ICD-10-CM

## 2021-01-06 DIAGNOSIS — L12.0 BULLOUS PEMPHIGOID: ICD-10-CM

## 2021-01-06 DIAGNOSIS — R29.6 REPEATED FALLS: ICD-10-CM

## 2021-01-06 DIAGNOSIS — K52.9 NONINFECTIVE GASTROENTERITIS AND COLITIS, UNSPECIFIED: ICD-10-CM

## 2021-01-06 DIAGNOSIS — Z91.040 LATEX ALLERGY STATUS: ICD-10-CM

## 2021-01-06 DIAGNOSIS — I95.9 HYPOTENSION, UNSPECIFIED: ICD-10-CM

## 2021-01-06 DIAGNOSIS — F29 UNSPECIFIED PSYCHOSIS NOT DUE TO A SUBSTANCE OR KNOWN PHYSIOLOGICAL CONDITION: ICD-10-CM

## 2021-01-06 DIAGNOSIS — Z79.52 LONG TERM (CURRENT) USE OF SYSTEMIC STEROIDS: ICD-10-CM

## 2021-01-06 DIAGNOSIS — E83.39 OTHER DISORDERS OF PHOSPHORUS METABOLISM: ICD-10-CM

## 2021-01-06 DIAGNOSIS — F22 DELUSIONAL DISORDERS: ICD-10-CM

## 2021-01-06 DIAGNOSIS — Z95.0 PRESENCE OF CARDIAC PACEMAKER: ICD-10-CM

## 2021-01-06 DIAGNOSIS — A41.59 OTHER GRAM-NEGATIVE SEPSIS: ICD-10-CM

## 2021-10-12 NOTE — ED PROVIDER NOTE - WR ORDER ID 1
Assessment & Plan     Type 2 diabetes mellitus with other specified complication, without long-term current use of insulin (H)  Increase Jardiance to 20 mg daily. Continue to check fasting blood sugars daily. He has extensive eye exams done for his cataract, will get authorization to receive records and if dilated eye exam retina has been checked and there is no need for diabetic eye exam. Foot exam done in the clinic today he has peripheral neuropathy in bilateral feet and hands.  Discussed with the patient to check blood pressure at home daily, blood pressure machine prescription given.  Will check urine albumin to creatinine ratio to assess endorgan damage and kidneys.      Rheumatoid arthritis involving both hands with positive rheumatoid factor (H)  Stable continue methotrexate.    Leukocytosis, unspecified type  Elevated white blood count on blood work done for preop.  Repeat CBC shows normal white blood count.        No follow-ups on file.    ORACIO GRANT MD  [facility] PAULA Mitchell is a 77 year old who presents for the following health issues  accompanied by his Daughter in law:    DAVID Valerio is 77-year-old gentleman who presented to the clinic for follow-up. He has rheumatoid arthritis, gouty arthritis and CKD stage III. He has type 2 diabetes as reflected in lab work and daily fasting blood sugars elevated between one 184-224. He is accompanied by his daughter-in-law and a  services were used to communicate with them. He is on Jardiance 10 mg daily. Recently his allopurinol dose was increased to 300 mg daily. He continues to take prednisone 5 mg daily.          Review of Systems       Objective    BP (!) 142/77 (BP Location: Left arm, Cuff Size: Adult Regular)   Pulse 80   Temp (!) 96.6  F (35.9  C) (Temporal)   Resp 20   Wt 71.7 kg (158 lb)   SpO2 95%   BMI 24.02 kg/m    Body mass index is 24.02 kg/m .  Physical Exam  Musculoskeletal:       Right foot: No deformity.      Left foot: No deformity.   Feet:      Right foot:      Protective Sensation: 10 sites tested. 0 sites sensed.      Skin integrity: Dry skin present. No ulcer or blister.      Toenail Condition: Fungal disease present.     Left foot:      Protective Sensation: 10 sites tested. 0 sites sensed.      Skin integrity: Dry skin present. No ulcer or blister.      Toenail Condition: Fungal disease present.             [id number]

## 2025-01-30 NOTE — PROGRESS NOTE ADULT - SUBJECTIVE AND OBJECTIVE BOX
On lisinopril as an outpatient  Current plan as under aortic dissection   INTERVAL HPI/OVERNIGHT EVENTS:    SUBJECTIVE: Patient seen and examined at bedside.     no cp, sob, abd pain, fever  no abd pain, nausea, vomiting, melena    OBJECTIVE:    VITAL SIGNS:  Vital Signs Last 24 Hrs  T(C): 36.2 (24 Dec 2020 05:00), Max: 37.1 (23 Dec 2020 19:25)  T(F): 97.1 (24 Dec 2020 05:00), Max: 98.8 (23 Dec 2020 19:25)  HR: 60 (24 Dec 2020 05:00) (59 - 84)  BP: 103/54 (24 Dec 2020 05:00) (88/56 - 132/69)  BP(mean): --  RR: 18 (24 Dec 2020 05:00) (17 - 19)  SpO2: 95% (24 Dec 2020 08:29) (93% - 100%)      PHYSICAL EXAM:    General: NAD  HEENT: NC/AT; PERRL, clear conjunctiva  Neck: supple  Respiratory: CTA b/l  Cardiovascular: +S1/S2; RRR  Abdomen: soft, NT/ND; +BS x4  Extremities: WWP, 2+ peripheral pulses b/l; no LE edema  Skin: normal color and turgor; no rash  Neurological:    MEDICATIONS:  MEDICATIONS  (STANDING):  aspirin  chewable 81 milliGRAM(s) Oral daily  atorvastatin Oral Tab/Cap - Peds 40 milliGRAM(s) Oral daily  chlorhexidine 4% Liquid 1 Application(s) Topical <User Schedule>  cholecalciferol 1000 Unit(s) Oral daily  ciprofloxacin   IVPB 400 milliGRAM(s) IV Intermittent every 12 hours  clonazePAM  Tablet 0.25 milliGRAM(s) Oral two times a day  cyanocobalamin 1000 MICROGram(s) Oral daily  folic acid 2 milliGRAM(s) Oral daily  heparin   Injectable 5000 Unit(s) SubCutaneous every 12 hours  hydrocortisone sodium succinate Injectable 50 milliGRAM(s) IV Push three times a day  lamoTRIgine 200 milliGRAM(s) Oral two times a day  metroNIDAZOLE  IVPB 500 milliGRAM(s) IV Intermittent once  metroNIDAZOLE  IVPB 500 milliGRAM(s) IV Intermittent every 8 hours  ondansetron Injectable 4 milliGRAM(s) IV Push once  pantoprazole    Tablet 40 milliGRAM(s) Oral before breakfast  sodium chloride 1 Gram(s) Oral three times a day  sodium chloride 0.9%. 1000 milliLiter(s) (100 mL/Hr) IV Continuous <Continuous>    MEDICATIONS  (PRN):  ALBUTerol    90 MICROgram(s) HFA Inhaler 2 Puff(s) Inhalation every 6 hours PRN Shortness of Breath and/or Wheezing      ALLERGIES:  Allergies    Lasix (Blisters)    Intolerances        LABS:                        10.2   17.89 )-----------( 168      ( 24 Dec 2020 08:29 )             30.8     Hemoglobin: 10.2 g/dL ( @ 08:29)  Hemoglobin: 10.6 g/dL ( @ 12:15)    CBC Full  -  ( 24 Dec 2020 08:29 )  WBC Count : 17.89 K/uL  RBC Count : 3.09 M/uL  Hemoglobin : 10.2 g/dL  Hematocrit : 30.8 %  Platelet Count - Automated : 168 K/uL  Mean Cell Volume : 99.7 fL  Mean Cell Hemoglobin : 33.0 pg  Mean Cell Hemoglobin Concentration : 33.1 g/dL  Auto Neutrophil # : x  Auto Lymphocyte # : x  Auto Monocyte # : x  Auto Eosinophil # : x  Auto Basophil # : x  Auto Neutrophil % : x  Auto Lymphocyte % : x  Auto Monocyte % : x  Auto Eosinophil % : x  Auto Basophil % : x        118<LL>  |  89<L>  |  11  ----------------------------<  139<H>  4.5   |  19  |  0.8    Ca    8.0<L>      24 Dec 2020 08:29    TPro  4.6<L>  /  Alb  2.7<L>  /  TBili  0.4  /  DBili  x   /  AST  28  /  ALT  9   /  AlkPhos  64  24    Creatinine Trend: 0.8<--, 0.7<--, 1.0<--  LIVER FUNCTIONS - ( 24 Dec 2020 08:29 )  Alb: 2.7 g/dL / Pro: 4.6 g/dL / ALK PHOS: 64 U/L / ALT: 9 U/L / AST: 28 U/L / GGT: x           PT/INR - ( 23 Dec 2020 12:15 )   PT: 14.10 sec;   INR: 1.23 ratio         PTT - ( 23 Dec 2020 12:15 )  PTT:29.0 sec    hs Troponin:            Urinalysis Basic - ( 23 Dec 2020 12:15 )    Color: Yellow / Appearance: Cloudy / S.025 / pH: x  Gluc: x / Ketone: 40  / Bili: Negative / Urobili: 0.2   Blood: x / Protein: 100 / Nitrite: Positive   Leuk Esterase: Large / RBC: 11-25 /HPF / WBC >50 /HPF   Sq Epi: x / Non Sq Epi: Few /HPF / Bacteria: TNTC /HPF      CSF:                      EKG:   MICROBIOLOGY:    IMAGING:      Labs, imaging, EKG personally reviewed    RADIOLOGY & ADDITIONAL TESTS: Reviewed.

## 2025-04-15 NOTE — BEHAVIORAL HEALTH ASSESSMENT NOTE - MUSCLE TONE / STRENGTH
Weight loss.../Inadequate energy intake.../Loss of subcutaneous fat.../Loss of muscle.../Fluid accumulation... Normal muscle tone/strength
